# Patient Record
Sex: MALE | Race: WHITE | NOT HISPANIC OR LATINO | Employment: PART TIME | URBAN - NONMETROPOLITAN AREA
[De-identification: names, ages, dates, MRNs, and addresses within clinical notes are randomized per-mention and may not be internally consistent; named-entity substitution may affect disease eponyms.]

---

## 2018-01-27 ENCOUNTER — HOSPITAL ENCOUNTER (EMERGENCY)
Facility: HOSPITAL | Age: 23
Discharge: HOME | End: 2018-01-28
Attending: FAMILY MEDICINE | Admitting: FAMILY MEDICINE
Payer: COMMERCIAL

## 2018-01-27 DIAGNOSIS — M62.830 LUMBAR PARASPINAL MUSCLE SPASM: ICD-10-CM

## 2018-01-27 PROCEDURE — 99285 EMERGENCY DEPT VISIT HI MDM: CPT | Mod: 25

## 2018-01-27 PROCEDURE — 96375 TX/PRO/DX INJ NEW DRUG ADDON: CPT

## 2018-01-27 PROCEDURE — 96372 THER/PROPH/DIAG INJ SC/IM: CPT | Mod: 59

## 2018-01-27 PROCEDURE — 96374 THER/PROPH/DIAG INJ IV PUSH: CPT

## 2018-01-27 PROCEDURE — 99283 EMERGENCY DEPT VISIT LOW MDM: CPT | Performed by: FAMILY MEDICINE

## 2018-01-27 NOTE — ED AVS SNAPSHOT
[facility]    [address] [address]    Phone:  [phone]                                       Edgar Garrett   MRN: [id number]    Department:  [facility]   Date of Visit:  1/27/2018           Patient Information     Date Of Birth          1995        Your diagnoses for this visit were:     Lumbar paraspinal muscle spasm        You were seen by Nisa Hudson MD.      Follow-up Information     Follow up with Yevgeniy Taylor MD.    Specialty:  Family Practice    Why:  As needed if symptoms not improved over weekend     Contact information:    [facility]  [address] [place] [address]  [phone]          Call Yevgeniy Taylor MD.    Specialty:  Family Practice    Contact information:    [facility]  [address] [place] [address]  [phone]          Discharge Instructions         Causes of Lumbar (Low Back) Pain  Low back pain can be caused by problems with any part of the lumbar spine. A disk can herniate (push out) and press on a nerve. Vertebrae can rub against each other or slip out of place. This can irritate facet joints and nerves. It can also lead to stenosis, a narrowing of the spinal canal or foramen.  Pressure from a disk  Constant wear and tear on a disk can cause it to weaken and push outward. Part of the disk may then press on nearby nerves. There are two common types of herniated disks:  Contained means the soft nucleus is protruding outward.   Extruded means the firm annulus has torn, letting the soft center squeeze through.     Pressure from bone  An unstable spine   With age, a disk may thin and wear out. Vertebrae above and below the disk may begin to touch. This can put pressure on nerves. It can also cause bone spurs (growths) to form where the bones rub together.    Stenosis results when bone spurs narrow the foramen or spinal canal. This also puts pressure on nerves. Slipping vertebrae can irritate  nerves and joints. They can also worsen stenosis.    In some cases, vertebrae become unstable and slip forward. This is called spondylolisthesis.     Date Last Reviewed: 10/12/2015    0265-4935 [employer]. [address] [place] [address], [place] [address]. All rights reserved. This information is not intended as a substitute for professional medical care. Always follow your healthcare professional's instructions.             Review of your medicines      START taking        Dose / Directions Last dose taken    cyclobenzaprine 10 MG tablet   Commonly known as:  FLEXERIL   Dose:  10 mg   Quantity:  20 tablet        Take 1 tablet (10 mg) by mouth 3 times daily as needed for muscle spasms   Refills:  1        oxyCODONE IR 5 MG tablet   Commonly known as:  ROXICODONE   Dose:  5 mg   Quantity:  20 tablet        Take 1 tablet (5 mg) by mouth every 6 hours as needed for pain   Refills:  0          Our records show that you are taking the medicines listed below. If these are incorrect, please call your family doctor or clinic.        Dose / Directions Last dose taken    BENADRYL ALLERGY PO   Dose:  1 tablet        Take 1 tablet by mouth daily as needed. Pt took one tab today hoping it would help his chest pain   Refills:  0        IBUPROFEN PO   Dose:  400 mg        Take 400 mg by mouth   Refills:  0        KLONOPIN PO   Dose:  1 mg        Take 1 mg by mouth as needed   Refills:  0        PRILOSEC PO   Dose:  40 mg        Take 40 mg by mouth as needed   Refills:  0                Prescriptions were sent or printed at these locations (2 Prescriptions)                   [facility] [address] [place] [address] - [address] AT [facility] [address], [place] [address]    Telephone:  [phone]   Fax:  [phone]   Hours:                  E-Prescribed (1 of 2)         cyclobenzaprine (FLEXERIL) 10 MG tablet                 Printed at Department/Unit printer (1 of 2)         oxyCODONE IR  "(ROXICODONE) 5 MG tablet                Procedures and tests performed during your visit     Peripheral IV catheter    XR Lumbar Spine 2/3 Views      Orders Needing Specimen Collection     None      Pending Results     Date and Time Order Name Status Description    2018 0034 XR Lumbar Spine 2/3 Views In process             Pending Culture Results     No orders found for last 3 day(s).            Thank you for choosing [facility]       Thank you for choosing [facility] for your care. Our goal is always to provide you with excellent care. Hearing back from our patients is one way we can continue to improve our services. Please take a few minutes to complete the written survey that you may receive in the mail after you visit with us. Thank you!        "[employer] Information     [email or web address] lets you send messages to your doctor, view your test results, renew your prescriptions, schedule appointments and more. To sign up, go to [email or web address] . Click on \"Log in\" on the left side of the screen, which will take you to the Welcome page. Then click on \"Sign up Now\" on the right side of the page.     You will be asked to enter the access code listed below, as well as some personal information. Please follow the directions to create your username and password.     Your access code is: [email or web address]  Expires: 2018  2:15 AM     Your access code will  in 90 days. If you need help or a new code, please call your [facility] clinic or [phone].        Care EveryWhere ID     This is your Care EveryWhere ID. This could be used by other organizations to access your [facility] medical records  [id number]        Equal Access to Services     [facility] : Hadoscar somers Sofrieda, waaxda luqadaha, qaybta kaalmada adedeclan, skyler diop . So [facility] [phone].    ATENCIÓN: Si habla español, tiene a johnson disposición servicios gratuitos de asistencia lingüística. Llame al [phone].    We " comply with applicable federal civil rights laws and [place] laws. We do not discriminate on the basis of race, color, national origin, age, disability, sex, sexual orientation, or gender identity.            After Visit Summary       This is your record. Keep this with you and show to your community pharmacist(s) and doctor(s) at your next visit.

## 2018-01-27 NOTE — ED AVS SNAPSHOT
[facility]    [address] [address]    Phone:  [phone]                                       Edgar Garrett   MRN: [id number]    Department[facility]  [facility]   Date of Visit:  1/27/2018           After Visit Summary Signature Page     I have received my discharge instructions, and my questions have been answered. I have discussed any challenges I see with this plan with the nurse or doctor.    ..........................................................................................................................................  Patient/Patient Representative Signature      ..........................................................................................................................................  Patient Representative Print Name and Relationship to Patient    ..................................................               ................................................  Date                                            Time    ..........................................................................................................................................  Reviewed by Signature/Title    ...................................................              ..............................................  Date                                                            Time

## 2018-01-28 ENCOUNTER — APPOINTMENT (OUTPATIENT)
Dept: CT IMAGING | Facility: HOSPITAL | Age: 23
End: 2018-01-28
Attending: FAMILY MEDICINE
Payer: COMMERCIAL

## 2018-01-28 ENCOUNTER — APPOINTMENT (OUTPATIENT)
Dept: GENERAL RADIOLOGY | Facility: HOSPITAL | Age: 23
End: 2018-01-28
Attending: FAMILY MEDICINE
Payer: COMMERCIAL

## 2018-01-28 VITALS
HEART RATE: 86 BPM | TEMPERATURE: 98.2 F | DIASTOLIC BLOOD PRESSURE: 85 MMHG | RESPIRATION RATE: 16 BRPM | SYSTOLIC BLOOD PRESSURE: 147 MMHG | OXYGEN SATURATION: 99 %

## 2018-01-28 PROCEDURE — 72128 CT CHEST SPINE W/O DYE: CPT | Mod: TC

## 2018-01-28 PROCEDURE — 25000125 ZZHC RX 250: Performed by: FAMILY MEDICINE

## 2018-01-28 PROCEDURE — 72131 CT LUMBAR SPINE W/O DYE: CPT | Mod: TC

## 2018-01-28 PROCEDURE — 72100 X-RAY EXAM L-S SPINE 2/3 VWS: CPT | Mod: TC

## 2018-01-28 PROCEDURE — 25000128 H RX IP 250 OP 636: Performed by: FAMILY MEDICINE

## 2018-01-28 PROCEDURE — 25000132 ZZH RX MED GY IP 250 OP 250 PS 637: Performed by: FAMILY MEDICINE

## 2018-01-28 RX ORDER — FENTANYL CITRATE 50 UG/ML
50 INJECTION, SOLUTION INTRAMUSCULAR; INTRAVENOUS ONCE
Status: COMPLETED | OUTPATIENT
Start: 2018-01-28 | End: 2018-01-28

## 2018-01-28 RX ORDER — OXYCODONE HYDROCHLORIDE 5 MG/1
10 TABLET ORAL ONCE
Status: COMPLETED | OUTPATIENT
Start: 2018-01-28 | End: 2018-01-28

## 2018-01-28 RX ORDER — KETOROLAC TROMETHAMINE 30 MG/ML
60 INJECTION, SOLUTION INTRAMUSCULAR; INTRAVENOUS ONCE
Status: COMPLETED | OUTPATIENT
Start: 2018-01-28 | End: 2018-01-28

## 2018-01-28 RX ORDER — CYCLOBENZAPRINE HCL 10 MG
10 TABLET ORAL 3 TIMES DAILY PRN
Qty: 20 TABLET | Refills: 1 | Status: SHIPPED | OUTPATIENT
Start: 2018-01-28 | End: 2020-01-25

## 2018-01-28 RX ORDER — OXYCODONE HYDROCHLORIDE 5 MG/1
5 TABLET ORAL EVERY 6 HOURS PRN
Qty: 20 TABLET | Refills: 0 | Status: SHIPPED | OUTPATIENT
Start: 2018-01-28 | End: 2022-08-30

## 2018-01-28 RX ORDER — CYCLOBENZAPRINE HCL 10 MG
10 TABLET ORAL ONCE
Status: COMPLETED | OUTPATIENT
Start: 2018-01-28 | End: 2018-01-28

## 2018-01-28 RX ORDER — ONDANSETRON 4 MG/1
4 TABLET, ORALLY DISINTEGRATING ORAL ONCE
Status: COMPLETED | OUTPATIENT
Start: 2018-01-28 | End: 2018-01-28

## 2018-01-28 RX ADMIN — OXYCODONE HYDROCHLORIDE 10 MG: 5 TABLET ORAL at 02:09

## 2018-01-28 RX ADMIN — KETOROLAC TROMETHAMINE 60 MG: 30 INJECTION, SOLUTION INTRAMUSCULAR at 03:05

## 2018-01-28 RX ADMIN — CYCLOBENZAPRINE HYDROCHLORIDE 10 MG: 10 TABLET, FILM COATED ORAL at 02:09

## 2018-01-28 RX ADMIN — HYDROMORPHONE HYDROCHLORIDE 1 MG: 1 INJECTION, SOLUTION INTRAMUSCULAR; INTRAVENOUS; SUBCUTANEOUS at 01:37

## 2018-01-28 RX ADMIN — FENTANYL CITRATE 50 MCG: 50 INJECTION INTRAMUSCULAR; INTRAVENOUS at 00:44

## 2018-01-28 RX ADMIN — ONDANSETRON 4 MG: 4 TABLET, ORALLY DISINTEGRATING ORAL at 03:16

## 2018-01-28 ASSESSMENT — ENCOUNTER SYMPTOMS
WEIGHT LOSS: 0
EYES NEGATIVE: 1
ENDOCRINE NEGATIVE: 1
PERIANAL NUMBNESS: 0
BACK PAIN: 1
BOWEL INCONTINENCE: 0
LEG PAIN: 1
CARDIOVASCULAR NEGATIVE: 1
ABDOMINAL PAIN: 0
WEAKNESS: 0
FEVER: 0
DYSURIA: 0
TINGLING: 0
HEADACHES: 0
PARESTHESIAS: 0
PSYCHIATRIC NEGATIVE: 1
NUMBNESS: 0
ALLERGIC/IMMUNOLOGIC NEGATIVE: 1
ABDOMINAL SWELLING: 0
RESPIRATORY NEGATIVE: 1

## 2018-01-28 NOTE — ED NOTES
Discharge instructions completed with patient with no questions or concerns. Vital signs stable, pain down to almost nothing while patient is lying still. Patient's friend giving patient ride home. Written prescription sent with patient, also aware of prescription to be picked up.

## 2018-01-28 NOTE — PROGRESS NOTES
Lumbar Spine XR - IMPRESSION: Degenerative changes of the lumbar spine.  Recommend follow up with primary care next week if symptoms not improving.  Report routed to PCP, Dr. COREY Taylor.

## 2018-01-28 NOTE — DISCHARGE INSTRUCTIONS
Causes of Lumbar (Low Back) Pain  Low back pain can be caused by problems with any part of the lumbar spine. A disk can herniate (push out) and press on a nerve. Vertebrae can rub against each other or slip out of place. This can irritate facet joints and nerves. It can also lead to stenosis, a narrowing of the spinal canal or foramen.  Pressure from a disk  Constant wear and tear on a disk can cause it to weaken and push outward. Part of the disk may then press on nearby nerves. There are two common types of herniated disks:  Contained means the soft nucleus is protruding outward.   Extruded means the firm annulus has torn, letting the soft center squeeze through.     Pressure from bone  An unstable spine   With age, a disk may thin and wear out. Vertebrae above and below the disk may begin to touch. This can put pressure on nerves. It can also cause bone spurs (growths) to form where the bones rub together.    Stenosis results when bone spurs narrow the foramen or spinal canal. This also puts pressure on nerves. Slipping vertebrae can irritate nerves and joints. They can also worsen stenosis.    In some cases, vertebrae become unstable and slip forward. This is called spondylolisthesis.     Date Last Reviewed: 10/12/2015    7938-3323 [employer]. [address] [place] [address], [place] [address]. All rights reserved. This information is not intended as a substitute for professional medical care. Always follow your healthcare professional's instructions.

## 2018-01-28 NOTE — ED NOTES
Patient presents accompanied by [facility] with complaint of back pain. Patient reports a sudden onset of back pain this evening while at home, tried taking Ibuprofen which did not improve the pain. Currently rating the pain 8/10. Reports a history of back pain, and has previously seen a chiropractor. Also reports right sciatica. Call light within reach.

## 2018-01-28 NOTE — PROGRESS NOTES
Lumbar spine CT w/o contrast - IMPRESSION: Degenerative changes L3-L4, L4-L5.   Recommend follow up with primary care next week if symptoms not improving.  Report routed to PCP, Dr. COREY Taylor.

## 2018-01-28 NOTE — ED PROVIDER NOTES
History     Chief Complaint   Patient presents with     Back Pain     Started suddenly tonight, 8/10.      Patient is a 22 year old male presenting with back pain.   Back Pain   Location:  Lumbar spine  Radiates to: lateral aspect of right leg . also left leg but les s.  Pain severity:  Severe  Pain is:  Same all the time  Onset quality:  Sudden  Duration:  1 hour  Timing:  Constant  Progression:  Unchanged  Chronicity:  New  Context: twisting    Context: not emotional stress, not falling, not jumping from heights, not lifting heavy objects, not MCA, not MVA, not occupational injury, not pedestrian accident, not physical stress, not recent illness and not recent injury    Relieved by:  Nothing  Worsened by:  Bending, movement, standing and twisting  Ineffective treatments:  NSAIDs  Associated symptoms: leg pain    Associated symptoms: no abdominal pain, no abdominal swelling, no bladder incontinence, no bowel incontinence, no chest pain, no dysuria, no fever, no headaches, no numbness, no paresthesias, no pelvic pain, no perianal numbness, no tingling, no weakness and no weight loss    Risk factors: obesity    Risk factors: no hx of cancer, no hx of osteoporosis, no lack of exercise, no menopause, not pregnant, no recent surgery, no steroid use and no vascular disease      Edgar Garrett is a 22 year old male who presents to ER by EMS after having sudden onset of severe back pain this evening while play ing cards . Patient was sitting in a chair and just turned in one direction and felt a sudden pop and immediate pain  . Patient called ambulance .     Problem List:    There are no active problems to display for this patient.       Past Medical History:    No past medical history on file.    Past Surgical History:    No past surgical history on file.    Family History:    No family history on file.    Social History:  Marital Status:  Single [1]  Social History   Substance Use Topics     Smoking status: Never  Smoker     Smokeless tobacco: Not on file     Alcohol use Not on file        Medications:      IBUPROFEN PO   Omeprazole (PRILOSEC PO)   ClonazePAM (KLONOPIN PO)   DiphenhydrAMINE HCl (BENADRYL ALLERGY PO)         Review of Systems   Constitutional: Negative for fever and weight loss.   HENT: Negative.    Eyes: Negative.    Respiratory: Negative.    Cardiovascular: Negative.  Negative for chest pain.   Gastrointestinal: Negative for abdominal pain and bowel incontinence.   Endocrine: Negative.    Genitourinary: Negative.  Negative for bladder incontinence, dysuria and pelvic pain.   Musculoskeletal: Positive for back pain.   Skin: Negative.    Allergic/Immunologic: Negative.    Neurological: Negative for tingling, weakness, numbness, headaches and paresthesias.   Psychiatric/Behavioral: Negative.    All other systems reviewed and are negative.      Physical Exam   BP: (!) 171/108  Pulse: 81  Temp: 97.5  F (36.4  C)  Resp: 16  SpO2: 98 %      Physical Exam   Constitutional: He is oriented to person, place, and time. He appears well-developed and well-nourished.   HENT:   Head: Normocephalic and atraumatic.   Neck: Normal range of motion.   Cardiovascular: Normal rate, regular rhythm and normal heart sounds.    Pulmonary/Chest: Effort normal and breath sounds normal.   Abdominal: Soft.   Protuberant abdomen without mass, tenderness    Musculoskeletal: He exhibits tenderness. He exhibits no edema or deformity.   Patient lying in hospital bed . Difficult exam as patient refusing to move. Negative straight leg raise.Tenderness at 30 degrees radiating only to the lower back . Equally strong lower extremities. Normalsymmetric  reflexes at patella and achilles.    Neurological: He is alert and oriented to person, place, and time.   Skin: Skin is warm and dry.   Nursing note and vitals reviewed.      ED Course     ED Course     Procedures          Patient arrived to ED by EMS with complaint of acute onset of low back pain .  Patient triaged to exam room. Vital signs significant for elevated blood presssure. Medical records reviewed. History and exam completed. Exam without acute neurologic findings. Patient given IV fentanyl , dilaudid, with improvement in pain 2/10 Patient then given oxycodone and flexeril with plan for discharge. Patient stating too uncomfortable for discharge. Blood pressures had improved at this time 60 mg toradol administered. CT lumbar and thoracic spine significant for degenerative disc disease at L3-L5 but no significant stenosis   . Results conveyed to patient. Patient discharged with small quantity of oxycodone and flexeril . REcommend Ibuprofen 600-800 mg 3-4 times daily with food. Ice next 48 hours. Recommend follow up with primary care next week if symptoms not improving          Labs Ordered and Resulted from Time of ED Arrival Up to the Time of Departure from the ED - No data to display    Assessments & Plan (with Medical Decision Making)     I have reviewed the nursing notes.    I have reviewed the findings, diagnosis, plan and need for follow up with the patient.       New Prescriptions    No medications on file       Final diagnoses:   Lumbar paraspinal muscle spasm       1/27/2018   [facility]     Nisa Hudson MD  01/28/18 0899

## 2018-01-28 NOTE — ED NOTES
Patient aware of CT results. Continues to have pain with movement, but now content with being discharged. Instructions previously reviewed with patient. Written prescription with patient. Patient escorted out by wheelchair by this writer.

## 2018-01-30 ENCOUNTER — OFFICE VISIT (OUTPATIENT)
Dept: CHIROPRACTIC MEDICINE | Facility: OTHER | Age: 23
End: 2018-01-30
Attending: CHIROPRACTOR
Payer: COMMERCIAL

## 2018-01-30 DIAGNOSIS — M99.02 SEGMENTAL AND SOMATIC DYSFUNCTION OF THORACIC REGION: ICD-10-CM

## 2018-01-30 DIAGNOSIS — M99.03 SEGMENTAL AND SOMATIC DYSFUNCTION OF LUMBAR REGION: Primary | ICD-10-CM

## 2018-01-30 DIAGNOSIS — M54.42 ACUTE BILATERAL LOW BACK PAIN WITH BILATERAL SCIATICA: ICD-10-CM

## 2018-01-30 DIAGNOSIS — M54.41 ACUTE BILATERAL LOW BACK PAIN WITH BILATERAL SCIATICA: ICD-10-CM

## 2018-01-30 PROCEDURE — 99202 OFFICE O/P NEW SF 15 MIN: CPT | Mod: 25 | Performed by: CHIROPRACTOR

## 2018-01-30 PROCEDURE — 98940 CHIROPRACT MANJ 1-2 REGIONS: CPT | Mod: AT | Performed by: CHIROPRACTOR

## 2018-01-30 NOTE — PROGRESS NOTES
Subjective Finding:    Chief compalint: Patient presents with:  Back Pain: intense low back pain  , Pain Scale: 9/10, Intensity: sharp, Duration: 3 days, Radiating: down right leg.    Date of injury:     Activities that the pain restricts:   Home/household/hobbies/social activities: yes.  Work duties: yes.  Sleep: yes.  Makes symptoms better: rest.  Makes symptoms worse: activity.  Have you seen anyone else for the symptoms? Yes: MD.  Work related: no.  Automobile related injury: no.    Objective and Assessment:    Posture Analysis:   High shoulder: .  Head tilt: .  High iliac crest: right.  Head carriage: neutral.  Thoracic Kyphosis: forward.  Lumbar Lordosis: forward.    Lumbar Range of Motion: extension decreased, left lateral flexion decreased and right lateral flexion decreased.  Cervical Range of Motion: .  Thoracic Range of Motion: extension decreased.  Extremity Range of Motion: .    Palpation:   Quad lumb: bilateral, referred pain: yes    Segmental dysfunction pre-treatment and treatment area: T11, L5 and PSIS Right.    Assessment post-treatment:  Cervical: .  Thoracic: ROM increased.  Lumbar: ROM increased.    Comments: .      Complicating Factors: .    Procedure(s):  [facility]:  [address] Chiropractic manipulative treatment 1-2 regions performed   Thoracic: Diversified, See above for level, Prone and Lumbar: Diversified, See above for level, Side posture    Modalities:  None performed this visit    Therapeutic procedures:  None    Plan:  Treatment plan: 2 times per week for 2 weeks.  Instructed patient: stretch as instructed at visit.  Short term goals: increase ROM.  Long term goals: increase ADL.  Prognosis: very good.

## 2018-01-30 NOTE — MR AVS SNAPSHOT
"              After Visit Summary   1/30/2018    Edgar Garrett    MRN: [id number]           Patient Information     Date Of Birth          1995        Visit Information        Provider Department      1/30/2018 10:40 AM [facility]        Today's Diagnoses     Segmental and somatic dysfunction of lumbar region    -  1    Acute bilateral low back pain with bilateral sciatica        Segmental and somatic dysfunction of thoracic region           Follow-ups after your visit        Your next 10 appointments already scheduled     Jan 31, 2018 10:10 AM CST   Return Visit with Shaheed Mcnulty DC   [facility] [place] ([facility])    [address]  [place] [address]   [phone]              Who to contact     If you have questions or need follow up information about today's clinic visit or your schedule please contact  [facility] [place] directly at [phone].  Normal or non-critical lab and imaging results will be communicated to you by [employer], letter or phone within 4 business days after the clinic has received the results. If you do not hear from us within 7 days, please contact the clinic through MyChart or phone. If you have a critical or abnormal lab result, we will notify you by phone as soon as possible.  Submit refill requests through [employer] or call your pharmacy and they will forward the refill request to us. Please allow 3 business days for your refill to be completed.          Additional Information About Your Visit        MyChart Information     [employer] lets you send messages to your doctor, view your test results, renew your prescriptions, schedule appointments and more. To sign up, go to [email or web address] . Click on \"Log in\" on the left side of the screen, which will take you to the Welcome page. Then click on \"Sign up Now\" on the right side of the page.     You will be asked to enter the access code listed below, as well as some " personal information. Please follow the directions to create your username and password.     Your access code is: [email or web address]  Expires: 2018  2:15 AM     Your access code will  in 90 days. If you need help or a new code, please call your [facility] clinic or [phone].        Care EveryWhere ID     This is your Care EveryWhere ID. This could be used by other organizations to access your [facility] medical records  [id number]         Blood Pressure from Last 3 Encounters:   18 147/85   16 159/89   02/10/15 134/85    Weight from Last 3 Encounters:   13 266 lb (120.7 kg) (>99 %)*     * Growth percentiles are based on [facility] 2-20 Years data.              We Performed the Following     CHIROPRAC MANIP,SPINAL,1-2 REGIONS        Primary Care Provider Office Phone # Fax #    Yevgeniy Taylor [phone]       [facility] [address] [facility] [address]        Equal Access to Services     [facility]: Hadii aad ku hadasho Soomaali, waaxda luqadaha, qaybta kaalmada adeegyada, waxay lashonda hayaan mainor diop . So [facility] [phone].    ATENCIÓN: Si habla español, tiene a johnson disposición servicios gratuitos de asistencia lingüística. Llame al [phone].    We comply with applicable federal civil rights laws and [place] laws. We do not discriminate on the basis of race, color, national origin, age, disability, sex, sexual orientation, or gender identity.            Thank you!     Thank you for choosing  [facility]  for your care. Our goal is always to provide you with excellent care. Hearing back from our patients is one way we can continue to improve our services. Please take a few minutes to complete the written survey that you may receive in the mail after your visit with us. Thank you!             Your Updated Medication List - Protect others around you: Learn how to safely use, store and throw away your medicines at www.disposemymeds.org.          This  list is accurate as of 1/30/18 12:52 PM.  Always use your most recent med list.                   Brand Name Dispense Instructions for use Diagnosis    BENADRYL ALLERGY PO      Take 1 tablet by mouth daily as needed. Pt took one tab today hoping it would help his chest pain        cyclobenzaprine 10 MG tablet    FLEXERIL    20 tablet    Take 1 tablet (10 mg) by mouth 3 times daily as needed for muscle spasms        IBUPROFEN PO      Take 400 mg by mouth        KLONOPIN PO      Take 1 mg by mouth as needed        oxyCODONE IR 5 MG tablet    ROXICODONE    20 tablet    Take 1 tablet (5 mg) by mouth every 6 hours as needed for pain        PRILOSEC PO      Take 40 mg by mouth as needed

## 2018-01-31 ENCOUNTER — OFFICE VISIT (OUTPATIENT)
Dept: CHIROPRACTIC MEDICINE | Facility: OTHER | Age: 23
End: 2018-01-31
Attending: CHIROPRACTOR
Payer: COMMERCIAL

## 2018-01-31 DIAGNOSIS — M54.50 ACUTE BILATERAL LOW BACK PAIN WITHOUT SCIATICA: ICD-10-CM

## 2018-01-31 DIAGNOSIS — M99.02 SEGMENTAL AND SOMATIC DYSFUNCTION OF THORACIC REGION: ICD-10-CM

## 2018-01-31 DIAGNOSIS — M99.03 SEGMENTAL AND SOMATIC DYSFUNCTION OF LUMBAR REGION: Primary | ICD-10-CM

## 2018-01-31 PROCEDURE — 98940 CHIROPRACT MANJ 1-2 REGIONS: CPT | Mod: AT | Performed by: CHIROPRACTOR

## 2018-01-31 NOTE — MR AVS SNAPSHOT
"              After Visit Summary   1/31/2018    Edgar Garrett    MRN: [id number]           Patient Information     Date Of Birth          1995        Visit Information        Provider Department      1/31/2018 10:10 AM [facility]        Today's Diagnoses     Segmental and somatic dysfunction of lumbar region    -  1    Acute bilateral low back pain without sciatica        Segmental and somatic dysfunction of thoracic region           Follow-ups after your visit        Your next 10 appointments already scheduled     Feb 02, 2018  9:00 AM CST   Return Visit with Shaheed Mcnulty DC   [facility] [place] ([facility])    [address]  [place] [address]   [phone]              Who to contact     If you have questions or need follow up information about today's clinic visit or your schedule please contact  [facility] [place] directly at [phone].  Normal or non-critical lab and imaging results will be communicated to you by Movilehart, letter or phone within 4 business days after the clinic has received the results. If you do not hear from us within 7 days, please contact the clinic through MyChart or phone. If you have a critical or abnormal lab result, we will notify you by phone as soon as possible.  Submit refill requests through E-Box - [email or web address] or call your pharmacy and they will forward the refill request to us. Please allow 3 business days for your refill to be completed.          Additional Information About Your Visit        MyChart Information     E-Box - [email or web address] lets you send messages to your doctor, view your test results, renew your prescriptions, schedule appointments and more. To sign up, go to [email or web address] - [email or web address] . Click on \"Log in\" on the left side of the screen, which will take you to the Welcome page. Then click on \"Sign up Now\" on the right side of the page.     You will be asked to enter the access code listed below, as well as some personal " information. Please follow the directions to create your username and password.     Your access code is: [email or web address]  Expires: 2018  2:15 AM     Your access code will  in 90 days. If you need help or a new code, please call your [facility] clinic or [phone].        Care EveryWhere ID     This is your Care EveryWhere ID. This could be used by other organizations to access your [facility] medical records  [id number]         Blood Pressure from Last 3 Encounters:   18 147/85   16 159/89   02/10/15 134/85    Weight from Last 3 Encounters:   13 266 lb (120.7 kg) (>99 %)*     * Growth percentiles are based on [facility] 2-20 Years data.              We Performed the Following     CHIROPRAC MANIP,SPINAL,1-2 REGIONS        Primary Care Provider Office Phone # Fax #    Yevgeniy Taylor [phone]       [facility] [address] [facility] [address]        Equal Access to Services     [facility]: Hadii aad ku hadasho Soomaali, waaxda luqadaha, qaybta kaalmada adeegyada, waxay genein haypkn mainor diop . So [facility] [place] [facility] [phone].    ATENCIÓN: Si habla español, tiene a johnson disposición servicios gratuitos de asistencia lingüística. Llame al [phone].    We comply with applicable federal civil rights laws and [place] laws. We do not discriminate on the basis of race, color, national origin, age, disability, sex, sexual orientation, or gender identity.            Thank you!     Thank you for choosing  [facility]  for your care. Our goal is always to provide you with excellent care. Hearing back from our patients is one way we can continue to improve our services. Please take a few minutes to complete the written survey that you may receive in the mail after your visit with us. Thank you!             Your Updated Medication List - Protect others around you: Learn how to safely use, store and throw away your medicines at www.disposemymeds.org.          This list is  accurate as of 1/31/18 10:38 AM.  Always use your most recent med list.                   Brand Name Dispense Instructions for use Diagnosis    BENADRYL ALLERGY PO      Take 1 tablet by mouth daily as needed. Pt took one tab today hoping it would help his chest pain        cyclobenzaprine 10 MG tablet    FLEXERIL    20 tablet    Take 1 tablet (10 mg) by mouth 3 times daily as needed for muscle spasms        IBUPROFEN PO      Take 400 mg by mouth        KLONOPIN PO      Take 1 mg by mouth as needed        oxyCODONE IR 5 MG tablet    ROXICODONE    20 tablet    Take 1 tablet (5 mg) by mouth every 6 hours as needed for pain        PRILOSEC PO      Take 40 mg by mouth as needed

## 2018-01-31 NOTE — PROGRESS NOTES
Subjective Finding:    Chief compalint: Patient presents with:  Back Pain: doing better.  still out of work due to the pain.  ADL improved slightly  , Pain Scale: 5/10, Intensity: sharp, Duration: 3 days, Radiating: down right leg.    Date of injury:     Activities that the pain restricts:   Home/household/hobbies/social activities: yes.  Work duties: yes.  Sleep: yes.  Makes symptoms better: rest.  Makes symptoms worse: activity.  Have you seen anyone else for the symptoms? Yes: MD.  Work related: no.  Automobile related injury: no.    Objective and Assessment:    Posture Analysis:   High shoulder: .  Head tilt: .  High iliac crest: right.  Head carriage: neutral.  Thoracic Kyphosis: forward.  Lumbar Lordosis: forward.    Lumbar Range of Motion: extension decreased, left lateral flexion decreased and right lateral flexion decreased.  Cervical Range of Motion: .  Thoracic Range of Motion: extension decreased.  Extremity Range of Motion: .    Palpation:   Quad lumb: bilateral, referred pain: yes    Segmental dysfunction pre-treatment and treatment area: T11, L5 and PSIS Right.    Assessment post-treatment:  Cervical: .  Thoracic: ROM increased.  Lumbar: ROM increased.    Comments: .      Complicating Factors: .    Procedure(s):  CMT:  10720 Chiropractic manipulative treatment 1-2 regions performed   Thoracic: Diversified, See above for level, Prone and Lumbar: Diversified, See above for level, Side posture    Modalities:  None performed this visit    Therapeutic procedures:  None    Plan:  Treatment plan: 2 times per week for 2 weeks.  Instructed patient: stretch as instructed at visit.  Short term goals: increase ROM.  Long term goals: increase ADL.  Prognosis: very good.

## 2018-02-02 ENCOUNTER — OFFICE VISIT (OUTPATIENT)
Dept: CHIROPRACTIC MEDICINE | Facility: OTHER | Age: 23
End: 2018-02-02
Attending: CHIROPRACTOR
Payer: COMMERCIAL

## 2018-02-02 DIAGNOSIS — M99.03 SEGMENTAL AND SOMATIC DYSFUNCTION OF LUMBAR REGION: Primary | ICD-10-CM

## 2018-02-02 DIAGNOSIS — M54.50 ACUTE BILATERAL LOW BACK PAIN WITHOUT SCIATICA: ICD-10-CM

## 2018-02-02 DIAGNOSIS — M99.02 SEGMENTAL AND SOMATIC DYSFUNCTION OF THORACIC REGION: ICD-10-CM

## 2018-02-02 PROCEDURE — 98940 CHIROPRACT MANJ 1-2 REGIONS: CPT | Mod: AT | Performed by: CHIROPRACTOR

## 2018-02-02 NOTE — MR AVS SNAPSHOT
"              After Visit Summary   2/2/2018    Edgar Garrett    MRN: [id number]           Patient Information     Date Of Birth          1995        Visit Information        Provider Department      2/2/2018 9:00 AM [facility]        Today's Diagnoses     Segmental and somatic dysfunction of lumbar region    -  1    Acute bilateral low back pain without sciatica        Segmental and somatic dysfunction of thoracic region           Follow-ups after your visit        Your next 10 appointments already scheduled     Feb 09, 2018  9:30 AM CST   Return Visit with Shaheed Mcnulty DC   [facility] [place] ([facility])    [address]  [place] [address]   [phone]              Who to contact     If you have questions or need follow up information about today's clinic visit or your schedule please contact  [facility] [place] directly at [phone].  Normal or non-critical lab and imaging results will be communicated to you by Symbian Foundationhart, letter or phone within 4 business days after the clinic has received the results. If you do not hear from us within 7 days, please contact the clinic through MyChart or phone. If you have a critical or abnormal lab result, we will notify you by phone as soon as possible.  Submit refill requests through Minefold or call your pharmacy and they will forward the refill request to us. Please allow 3 business days for your refill to be completed.          Additional Information About Your Visit        MyChart Information     Minefold lets you send messages to your doctor, view your test results, renew your prescriptions, schedule appointments and more. To sign up, go to [email or web address] . Click on \"Log in\" on the left side of the screen, which will take you to the Welcome page. Then click on \"Sign up Now\" on the right side of the page.     You will be asked to enter the access code listed below, as well as some personal " information. Please follow the directions to create your username and password.     Your access code is: [email or web address]  Expires: 2018  2:15 AM     Your access code will  in 90 days. If you need help or a new code, please call your [facility] clinic or [phone].        Care EveryWhere ID     This is your Care EveryWhere ID. This could be used by other organizations to access your [facility] medical records  [id number]         Blood Pressure from Last 3 Encounters:   18 147/85   16 159/89   02/10/15 134/85    Weight from Last 3 Encounters:   13 266 lb (120.7 kg) (>99 %)*     * Growth percentiles are based on [facility] 2-20 Years data.              We Performed the Following     CHIROPRAC MANIP,SPINAL,1-2 REGIONS        Primary Care Provider Office Phone # Fax #    Yevgeniy Taylor [phone]       [facility] [address] [facility] [address]        Equal Access to Services     [facility]: Hadii aad ku hadasho Soomaali, waaxda luqadaha, qaybta kaalmada adeegyada, waxay genein haypkn mainor diop . So [facility] [phone].    ATENCIÓN: Si habla español, tiene a johnson disposición servicios gratuitos de asistencia lingüística. Llame al [phone].    We comply with applicable federal civil rights laws and [place] laws. We do not discriminate on the basis of race, color, national origin, age, disability, sex, sexual orientation, or gender identity.            Thank you!     Thank you for choosing  [facility]  for your care. Our goal is always to provide you with excellent care. Hearing back from our patients is one way we can continue to improve our services. Please take a few minutes to complete the written survey that you may receive in the mail after your visit with us. Thank you!             Your Updated Medication List - Protect others around you: Learn how to safely use, store and throw away your medicines at www.disposemymeds.org.          This list is  accurate as of 2/2/18 11:59 PM.  Always use your most recent med list.                   Brand Name Dispense Instructions for use Diagnosis    BENADRYL ALLERGY PO      Take 1 tablet by mouth daily as needed. Pt took one tab today hoping it would help his chest pain        cyclobenzaprine 10 MG tablet    FLEXERIL    20 tablet    Take 1 tablet (10 mg) by mouth 3 times daily as needed for muscle spasms        IBUPROFEN PO      Take 400 mg by mouth        KLONOPIN PO      Take 1 mg by mouth as needed        oxyCODONE IR 5 MG tablet    ROXICODONE    20 tablet    Take 1 tablet (5 mg) by mouth every 6 hours as needed for pain        PRILOSEC PO      Take 40 mg by mouth as needed

## 2018-02-05 ENCOUNTER — OFFICE VISIT (OUTPATIENT)
Dept: CHIROPRACTIC MEDICINE | Facility: OTHER | Age: 23
End: 2018-02-05
Attending: CHIROPRACTOR
Payer: COMMERCIAL

## 2018-02-05 DIAGNOSIS — M54.50 ACUTE BILATERAL LOW BACK PAIN WITHOUT SCIATICA: ICD-10-CM

## 2018-02-05 DIAGNOSIS — M99.02 SEGMENTAL AND SOMATIC DYSFUNCTION OF THORACIC REGION: ICD-10-CM

## 2018-02-05 DIAGNOSIS — M99.03 SEGMENTAL AND SOMATIC DYSFUNCTION OF LUMBAR REGION: Primary | ICD-10-CM

## 2018-02-05 PROCEDURE — 98940 CHIROPRACT MANJ 1-2 REGIONS: CPT | Mod: AT | Performed by: CHIROPRACTOR

## 2018-02-05 NOTE — PROGRESS NOTES
Subjective Finding:    Chief compalint: Patient presents with:  Back Pain: getting better./  going to work  , Pain Scale: 3/10, Intensity: sharp, Duration: 5 days, Radiating: down right leg.    Date of injury:     Activities that the pain restricts:   Home/household/hobbies/social activities: yes.  Work duties: yes.  Sleep: yes.  Makes symptoms better: rest.  Makes symptoms worse: activity.  Have you seen anyone else for the symptoms? Yes: MD.  Work related: no.  Automobile related injury: no.    Objective and Assessment:    Posture Analysis:   High shoulder: .  Head tilt: .  High iliac crest: right.  Head carriage: neutral.  Thoracic Kyphosis: forward.  Lumbar Lordosis: forward.    Lumbar Range of Motion: extension decreased, left lateral flexion decreased and right lateral flexion decreased.  Cervical Range of Motion: .  Thoracic Range of Motion: extension decreased.  Extremity Range of Motion: .    Palpation:   Quad lumb: bilateral, referred pain: yes    Segmental dysfunction pre-treatment and treatment area: T11, L5 and PSIS Right.    Assessment post-treatment:  Cervical: .  Thoracic: ROM increased.  Lumbar: ROM increased.    Comments: .      Complicating Factors: .    Procedure(s):  CMT:  23579 Chiropractic manipulative treatment 1-2 regions performed   Thoracic: Diversified, See above for level, Prone and Lumbar: Diversified, See above for level, Side posture    Modalities:  None performed this visit    Therapeutic procedures:  None    Plan:  Treatment plan: 2 times per week for 2 weeks.  Instructed patient: stretch as instructed at visit.  Short term goals: increase ROM.  Long term goals: increase ADL.  Prognosis: very good.

## 2018-02-05 NOTE — PROGRESS NOTES
Subjective Finding:    Chief compalint: Patient presents with:  Back Pain: getting better  , Pain Scale: 3/10, Intensity: sharp, Duration: 5 days, Radiating: down right leg.    Date of injury:     Activities that the pain restricts:   Home/household/hobbies/social activities: yes.  Work duties: yes.  Sleep: yes.  Makes symptoms better: rest.  Makes symptoms worse: activity.  Have you seen anyone else for the symptoms? Yes: MD.  Work related: no.  Automobile related injury: no.    Objective and Assessment:    Posture Analysis:   High shoulder: .  Head tilt: .  High iliac crest: right.  Head carriage: neutral.  Thoracic Kyphosis: forward.  Lumbar Lordosis: forward.    Lumbar Range of Motion: extension decreased, left lateral flexion decreased and right lateral flexion decreased.  Cervical Range of Motion: .  Thoracic Range of Motion: extension decreased.  Extremity Range of Motion: .    Palpation:   Quad lumb: bilateral, referred pain: yes    Segmental dysfunction pre-treatment and treatment area: T11, L5 and PSIS Right.    Assessment post-treatment:  Cervical: .  Thoracic: ROM increased.  Lumbar: ROM increased.    Comments: .      Complicating Factors: .    Procedure(s):  [facility]:  52238 Chiropractic manipulative treatment 1-2 regions performed   Thoracic: Diversified, See above for level, Prone and Lumbar: Diversified, See above for level, Side posture    Modalities:  None performed this visit    Therapeutic procedures:  None    Plan:  Treatment plan: 2 times per week for 2 weeks.  Instructed patient: stretch as instructed at visit.  Short term goals: increase ROM.  Long term goals: increase ADL.  Prognosis: very good.

## 2018-02-05 NOTE — MR AVS SNAPSHOT
"              After Visit Summary   2/5/2018    Edgar Garrett    MRN: [id number]           Patient Information     Date Of Birth          1995        Visit Information        Provider Department      2/5/2018 9:10 AM [facility]        Today's Diagnoses     Segmental and somatic dysfunction of lumbar region    -  1    Acute bilateral low back pain without sciatica        Segmental and somatic dysfunction of thoracic region           Follow-ups after your visit        Your next 10 appointments already scheduled     Feb 09, 2018  9:30 AM CST   Return Visit with Shaheed Mcnulty DC   [facility] [place] ([facility])    [address]  [place] [address]   [phone]              Who to contact     If you have questions or need follow up information about today's clinic visit or your schedule please contact  [facility] [place] directly at [phone].  Normal or non-critical lab and imaging results will be communicated to you by Interactive Fitnesshart, letter or phone within 4 business days after the clinic has received the results. If you do not hear from us within 7 days, please contact the clinic through MyChart or phone. If you have a critical or abnormal lab result, we will notify you by phone as soon as possible.  Submit refill requests through [employer] or call your pharmacy and they will forward the refill request to us. Please allow 3 business days for your refill to be completed.          Additional Information About Your Visit        MyChart Information     [employer] lets you send messages to your doctor, view your test results, renew your prescriptions, schedule appointments and more. To sign up, go to [email or web address] . Click on \"Log in\" on the left side of the screen, which will take you to the Welcome page. Then click on \"Sign up Now\" on the right side of the page.     You will be asked to enter the access code listed below, as well as some personal " information. Please follow the directions to create your username and password.     Your access code is: [email or web address]  Expires: 2018  2:15 AM     Your access code will  in 90 days. If you need help or a new code, please call your [facility] clinic or [phone].        Care EveryWhere ID     This is your Care EveryWhere ID. This could be used by other organizations to access your [facility] medical records  [id number]         Blood Pressure from Last 3 Encounters:   18 147/85   16 159/89   02/10/15 134/85    Weight from Last 3 Encounters:   13 266 lb (120.7 kg) (>99 %)*     * Growth percentiles are based on [facility] 2-20 Years data.              We Performed the Following     CHIROPRAC MANIP,SPINAL,1-2 REGIONS        Primary Care Provider Office Phone # Fax #    Yevgeniy Taylor [phone]       [facility] [address] [facility] [address]        Equal Access to Services     [facility]: Hadii aad ku hadasho Soomaali, waaxda luqadaha, qaybta kaalmada adeegyada, waxay genein haypkn mainor diop . So [facility] [phone].    ATENCIÓN: Si habla español, tiene a johnson disposición servicios gratuitos de asistencia lingüística. Llame al [phone].    We comply with applicable federal civil rights laws and [place] laws. We do not discriminate on the basis of race, color, national origin, age, disability, sex, sexual orientation, or gender identity.            Thank you!     Thank you for choosing  [facility]  for your care. Our goal is always to provide you with excellent care. Hearing back from our patients is one way we can continue to improve our services. Please take a few minutes to complete the written survey that you may receive in the mail after your visit with us. Thank you!             Your Updated Medication List - Protect others around you: Learn how to safely use, store and throw away your medicines at www.disposemymeds.org.          This list is  accurate as of 2/5/18  9:59 AM.  Always use your most recent med list.                   Brand Name Dispense Instructions for use Diagnosis    BENADRYL ALLERGY PO      Take 1 tablet by mouth daily as needed. Pt took one tab today hoping it would help his chest pain        cyclobenzaprine 10 MG tablet    FLEXERIL    20 tablet    Take 1 tablet (10 mg) by mouth 3 times daily as needed for muscle spasms        IBUPROFEN PO      Take 400 mg by mouth        KLONOPIN PO      Take 1 mg by mouth as needed        oxyCODONE IR 5 MG tablet    ROXICODONE    20 tablet    Take 1 tablet (5 mg) by mouth every 6 hours as needed for pain        PRILOSEC PO      Take 40 mg by mouth as needed

## 2018-02-09 ENCOUNTER — OFFICE VISIT (OUTPATIENT)
Dept: CHIROPRACTIC MEDICINE | Facility: OTHER | Age: 23
End: 2018-02-09
Attending: CHIROPRACTOR
Payer: COMMERCIAL

## 2018-02-09 DIAGNOSIS — M99.03 SEGMENTAL AND SOMATIC DYSFUNCTION OF LUMBAR REGION: Primary | ICD-10-CM

## 2018-02-09 DIAGNOSIS — M54.50 ACUTE BILATERAL LOW BACK PAIN WITHOUT SCIATICA: ICD-10-CM

## 2018-02-09 DIAGNOSIS — M99.02 SEGMENTAL AND SOMATIC DYSFUNCTION OF THORACIC REGION: ICD-10-CM

## 2018-02-09 PROCEDURE — 98940 CHIROPRACT MANJ 1-2 REGIONS: CPT | Mod: AT | Performed by: CHIROPRACTOR

## 2018-02-09 NOTE — MR AVS SNAPSHOT
"              After Visit Summary   2/9/2018    Edgar Garrett    MRN: [id number]           Patient Information     Date Of Birth          1995        Visit Information        Provider Department      2/9/2018 9:30 AM Shaheed Mcnulty [facility]        Today's Diagnoses     Segmental and somatic dysfunction of lumbar region    -  1    Acute bilateral low back pain without sciatica        Segmental and somatic dysfunction of thoracic region           Follow-ups after your visit        Your next 10 appointments already scheduled     Feb 16, 2018  9:30 AM CST   Return Visit with Shaheed Mcnulty DC   [facility] [place] ([facility])    [address]  [place] [address]   [phone]              Who to contact     If you have questions or need follow up information about today's clinic visit or your schedule please contact  [facility] [place] directly at [phone].  Normal or non-critical lab and imaging results will be communicated to you by Larkyhart, letter or phone within 4 business days after the clinic has received the results. If you do not hear from us within 7 days, please contact the clinic through MyChart or phone. If you have a critical or abnormal lab result, we will notify you by phone as soon as possible.  Submit refill requests through Ipselex or call your pharmacy and they will forward the refill request to us. Please allow 3 business days for your refill to be completed.          Additional Information About Your Visit        MyChart Information     Ipselex lets you send messages to your doctor, view your test results, renew your prescriptions, schedule appointments and more. To sign up, go to [email or web address] . Click on \"Log in\" on the left side of the screen, which will take you to the Welcome page. Then click on \"Sign up Now\" on the right side of the page.     You will be asked to enter the access code listed below, as well as some personal " information. Please follow the directions to create your username and password.     Your access code is: [email or web address]  Expires: 2018  2:15 AM     Your access code will  in 90 days. If you need help or a new code, please call your [facility] clinic or [phone].        Care EveryWhere ID     This is your Care EveryWhere ID. This could be used by other organizations to access your [facility] medical records  [id number]         Blood Pressure from Last 3 Encounters:   18 147/85   16 159/89   02/10/15 134/85    Weight from Last 3 Encounters:   13 266 lb (120.7 kg) (>99 %)*     * Growth percentiles are based on [facility] 2-20 Years data.              We Performed the Following     CHIROPRAC MANIP,SPINAL,1-2 REGIONS        Primary Care Provider Office Phone # Fax #    Yevgeniy Taylor [phone]       [facility] [address] [facility] [address]        Equal Access to Services     [facility]: Hadii aad ku hadasho Soomaali, waaxda luqadaha, qaybta kaalmada adeegyada, waxay genein haypkn mainor diop . So [facility] [phone].    ATENCIÓN: Si habla español, tiene a johnson disposición servicios gratuitos de asistencia lingüística. Llame al [phone].    We comply with applicable federal civil rights laws and [place] laws. We do not discriminate on the basis of race, color, national origin, age, disability, sex, sexual orientation, or gender identity.            Thank you!     Thank you for choosing  [facility]  for your care. Our goal is always to provide you with excellent care. Hearing back from our patients is one way we can continue to improve our services. Please take a few minutes to complete the written survey that you may receive in the mail after your visit with us. Thank you!             Your Updated Medication List - Protect others around you: Learn how to safely use, store and throw away your medicines at www.disposemymeds.org.          This list is  accurate as of 2/9/18 11:59 PM.  Always use your most recent med list.                   Brand Name Dispense Instructions for use Diagnosis    BENADRYL ALLERGY PO      Take 1 tablet by mouth daily as needed. Pt took one tab today hoping it would help his chest pain        cyclobenzaprine 10 MG tablet    FLEXERIL    20 tablet    Take 1 tablet (10 mg) by mouth 3 times daily as needed for muscle spasms        IBUPROFEN PO      Take 400 mg by mouth        KLONOPIN PO      Take 1 mg by mouth as needed        oxyCODONE IR 5 MG tablet    ROXICODONE    20 tablet    Take 1 tablet (5 mg) by mouth every 6 hours as needed for pain        PRILOSEC PO      Take 40 mg by mouth as needed

## 2018-02-12 NOTE — PROGRESS NOTES
Subjective Finding:    Chief compalint: Patient presents with:  Back Pain: good relief, back to work with no restrictions  , Pain Scale: 3/10, Intensity: sharp, Duration: 5 days, Radiating: down right leg.    Date of injury:     Activities that the pain restricts:   Home/household/hobbies/social activities: yes.  Work duties: yes.  Sleep: yes.  Makes symptoms better: rest.  Makes symptoms worse: activity.  Have you seen anyone else for the symptoms? Yes: MD.  Work related: no.  Automobile related injury: no.    Objective and Assessment:    Posture Analysis:   High shoulder: .  Head tilt: .  High iliac crest: right.  Head carriage: neutral.  Thoracic Kyphosis: forward.  Lumbar Lordosis: forward.    Lumbar Range of Motion: extension decreased, left lateral flexion decreased and right lateral flexion decreased.  Cervical Range of Motion: .  Thoracic Range of Motion: extension decreased.  Extremity Range of Motion: .    Palpation:   Quad lumb: bilateral, referred pain: yes    Segmental dysfunction pre-treatment and treatment area: T11, L5 and PSIS Right.    Assessment post-treatment:  Cervical: .  Thoracic: ROM increased.  Lumbar: ROM increased.    Comments: .      Complicating Factors: .    Procedure(s):  CMT:  73422 Chiropractic manipulative treatment 1-2 regions performed   Thoracic: Diversified, See above for level, Prone and Lumbar: Diversified, See above for level, Side posture    Modalities:  None performed this visit    Therapeutic procedures:  None    Plan:  Treatment plan: 2 times per week for 2 weeks.  Instructed patient: stretch as instructed at visit.  Short term goals: increase ROM.  Long term goals: increase ADL.  Prognosis: very good.

## 2018-03-29 ENCOUNTER — OFFICE VISIT (OUTPATIENT)
Dept: CHIROPRACTIC MEDICINE | Facility: OTHER | Age: 23
End: 2018-03-29
Attending: CHIROPRACTOR
Payer: COMMERCIAL

## 2018-03-29 DIAGNOSIS — M99.03 SEGMENTAL AND SOMATIC DYSFUNCTION OF LUMBAR REGION: Primary | ICD-10-CM

## 2018-03-29 DIAGNOSIS — M54.41 ACUTE RIGHT-SIDED LOW BACK PAIN WITH RIGHT-SIDED SCIATICA: ICD-10-CM

## 2018-03-29 DIAGNOSIS — M99.02 SEGMENTAL AND SOMATIC DYSFUNCTION OF THORACIC REGION: ICD-10-CM

## 2018-03-29 PROCEDURE — 98940 CHIROPRACT MANJ 1-2 REGIONS: CPT | Mod: AT | Performed by: CHIROPRACTOR

## 2018-03-29 NOTE — MR AVS SNAPSHOT
"              After Visit Summary   3/29/2018    Edgar Garrett    MRN: [id number]           Patient Information     Date Of Birth          1995        Visit Information        Provider Department      3/29/2018 1:20 PM Shaheed Mc[facility]ulty [facility]        Today's Diagnoses     Segmental and somatic dysfunction of lumbar region    -  1    Acute right-sided low back pain with right-sided sciatica        Segmental and somatic dysfunction of thoracic region           Follow-ups after your visit        Your next 10 appointments already scheduled     Apr 02, 2018  1:30 PM CDT   Return Visit with Shaheed Mcnulty DC   [facility] [place] [facility]    [address]  [facility] [address]   [phone]              Who to contact     If you have questions or need follow up information about today's clinic visit or your schedule please contact  [facility] [place] directly at [phone].  Normal or non-critical lab and imaging results will be communicated to you by MyChart, letter or phone within 4 business days after the clinic has received the results. If you do not hear from us within 7 days, please contact the clinic through Bl[employer] Instrumentshart or phone. If you have a critical or abnormal lab result, we will notify you by phone as soon as possible.  Submit refill requests through Runa or call your pharmacy and they will forward the refill request to us. Please allow 3 business days for your refill to be completed.          Additional Information About Your Visit        MyChart Information     Runa lets you send messages to your doctor, view your test results, renew your prescriptions, schedule appointments and more. To sign up, go to [email or web address] . Click on \"Log in\" on the left side of the screen, which will take you to the Welcome page. Then click on \"Sign up Now\" on the right side of the page.     You will be asked to enter the access code listed below, as well as some " personal information. Please follow the directions to create your username and password.     Your access code is: [email or web address]  Expires: 2018  3:15 AM     Your access code will  in 90 days. If you need help or a new code, please call your [place] clinic or [phone].        Care EveryWhere ID     This is your Care EveryWhere ID. This could be used by other organizations to access your [place] medical records  [id number]         Blood Pressure from Last 3 Encounters:   18 147/85   16 159/89   02/10/15 134/85    Weight from Last 3 Encounters:   13 266 lb (120.7 kg) (>99 %)*     * Growth percentiles are based on [facility] 2-20 Years data.              We Performed the Following     CHIROPRAC MANIP,SPINAL,1-2 REGIONS        Primary Care Provider Office Phone # Fax #    Yevgeniy Taylor [phone]       [facility] [address] [facility] [address]        Equal Access to Services     [facility]: Hadii aad ku hadasho Soomaali, waaxda luqadaha, qaybta kaalmada adeegyada, waxay lashonda hayaan mainor diop . So [facility] [phone].    ATENCIÓN: Si habla español, tiene a johnson disposición servicios gratuitos de asistencia lingüística. Llame al [phone].    We comply with applicable federal civil rights laws and [place] laws. We do not discriminate on the basis of race, color, national origin, age, disability, sex, sexual orientation, or gender identity.            Thank you!     Thank you for choosing  [facility]  for your care. Our goal is always to provide you with excellent care. Hearing back from our patients is one way we can continue to improve our services. Please take a few minutes to complete the written survey that you may receive in the mail after your visit with us. Thank you!             Your Updated Medication List - Protect others around you: Learn how to safely use, store and throw away your medicines at www.disposemymeds.org.          This  list is accurate as of 3/29/18  2:22 PM.  Always use your most recent med list.                   Brand Name Dispense Instructions for use Diagnosis    BENADRYL ALLERGY PO      Take 1 tablet by mouth daily as needed. Pt took one tab today hoping it would help his chest pain        cyclobenzaprine 10 MG tablet    FLEXERIL    20 tablet    Take 1 tablet (10 mg) by mouth 3 times daily as needed for muscle spasms        IBUPROFEN PO      Take 400 mg by mouth        KLONOPIN PO      Take 1 mg by mouth as needed        oxyCODONE IR 5 MG tablet    ROXICODONE    20 tablet    Take 1 tablet (5 mg) by mouth every 6 hours as needed for pain        PRILOSEC PO      Take 40 mg by mouth as needed

## 2018-03-29 NOTE — PROGRESS NOTES
Subjective Finding:    Chief compalint: Patient presents with:  Back Pain: right leg  , Pain Scale: 6/10, Intensity: sharp, Duration: 2 days, Radiating: down right leg.    Date of injury:     Activities that the pain restricts:   Home/household/hobbies/social activities: yes.  Work duties: yes.  Sleep: yes.  Makes symptoms better: rest.  Makes symptoms worse: activity.  Have you seen anyone else for the symptoms? Yes: MD.  Work related: no.  Automobile related injury: no.    Objective and Assessment:    Posture Analysis:   High shoulder: .  Head tilt: .  High iliac crest: right.  Head carriage: neutral.  Thoracic Kyphosis: forward.  Lumbar Lordosis: forward.    Lumbar Range of Motion: extension decreased, left lateral flexion decreased and right lateral flexion decreased.  Cervical Range of Motion: .  Thoracic Range of Motion: extension decreased.  Extremity Range of Motion: .    Palpation:   Quad lumb: bilateral, referred pain: yes    Segmental dysfunction pre-treatment and treatment area: T11, L5 and PSIS Right.    Assessment post-treatment:  Cervical: .  Thoracic: ROM increased.  Lumbar: ROM increased.    Comments: .      Complicating Factors: .    Procedure(s):  [facility]:  00381 Chiropractic manipulative treatment 1-2 regions performed   Thoracic: Diversified, See above for level, Prone and Lumbar: Diversified, See above for level, Side posture    Modalities:  None performed this visit    Therapeutic procedures:  None    Plan:  Treatment plan: 2 times per week for 2 weeks.  Instructed patient: stretch as instructed at visit.  Short term goals: increase ROM.  Long term goals: increase ADL.  Prognosis: very good.

## 2018-04-02 ENCOUNTER — OFFICE VISIT (OUTPATIENT)
Dept: CHIROPRACTIC MEDICINE | Facility: OTHER | Age: 23
End: 2018-04-02
Attending: CHIROPRACTOR
Payer: COMMERCIAL

## 2018-04-02 DIAGNOSIS — M99.03 SEGMENTAL AND SOMATIC DYSFUNCTION OF LUMBAR REGION: Primary | ICD-10-CM

## 2018-04-02 DIAGNOSIS — M99.02 SEGMENTAL AND SOMATIC DYSFUNCTION OF THORACIC REGION: ICD-10-CM

## 2018-04-02 DIAGNOSIS — M54.50 ACUTE BILATERAL LOW BACK PAIN WITHOUT SCIATICA: ICD-10-CM

## 2018-04-02 PROCEDURE — 98940 CHIROPRACT MANJ 1-2 REGIONS: CPT | Mod: AT | Performed by: CHIROPRACTOR

## 2018-04-02 NOTE — MR AVS SNAPSHOT
"              After Visit Summary   2018    Edgar Garrett    MRN: [id number]           Patient Information     Date Of Birth          1995        Visit Information        Provider Department      2018 1:30 PM [facility]        Today's Diagnoses     Segmental and somatic dysfunction of lumbar region    -  1    Acute bilateral low back pain without sciatica        Segmental and somatic dysfunction of thoracic region           Follow-ups after your visit        Who to contact     If you have questions or need follow up information about today's clinic visit or your schedule please contact  [facility] directly at [phone].  Normal or non-critical lab and imaging results will be communicated to you by MyChart, letter or phone within 4 business days after the clinic has received the results. If you do not hear from us within 7 days, please contact the clinic through [employer] or phone. If you have a critical or abnormal lab result, we will notify you by phone as soon as possible.  Submit refill requests through [employer] or call your pharmacy and they will forward the refill request to us. Please allow 3 business days for your refill to be completed.          Additional Information About Your Visit        MyChart Information     [employer] lets you send messages to your doctor, view your test results, renew your prescriptions, schedule appointments and more. To sign up, go to [email or web address] . Click on \"Log in\" on the left side of the screen, which will take you to the Welcome page. Then click on \"Sign up Now\" on the right side of the page.     You will be asked to enter the access code listed below, as well as some personal information. Please follow the directions to create your username and password.     Your access code is: [email or web address]  Expires: 2018  3:15 AM     Your access code will  in 90 days. If you need help or a new code, please call " your [place] clinic or [phone].        Care EveryWhere ID     This is your Care EveryWhere ID. This could be used by other organizations to access your [place] medical records  [id number]         Blood Pressure from Last 3 Encounters:   01/28/18 147/85   08/05/16 159/89   02/10/15 134/85    Weight from Last 3 Encounters:   09/11/13 266 lb (120.7 kg) (>99 %)*     * Growth percentiles are based on [facility] 2-20 Years data.              We Performed the Following     CHIROPRAC MANIP,SPINAL,1-2 REGIONS        Primary Care Provider Office Phone # Fax #    Yevgeniy Taylor [phone]       [facility] [address] [facility] [address]        Equal Access to Services     [facility] : Hadii chrissie gibbs hadasho Soomaali, waaxda luqadaha, qaybta kaalmada adeegyada, skyler diop . So [facility] [phone].    ATENCIÓN: Si habla español, tiene a johnson disposición servicios gratuitos de asistencia lingüística. Llame al [phone].    We comply with applicable federal civil rights laws and [place] laws. We do not discriminate on the basis of race, color, national origin, age, disability, sex, sexual orientation, or gender identity.            Thank you!     Thank you for choosing  [facility]  for your care. Our goal is always to provide you with excellent care. Hearing back from our patients is one way we can continue to improve our services. Please take a few minutes to complete the written survey that you may receive in the mail after your visit with us. Thank you!             Your Updated Medication List - Protect others around you: Learn how to safely use, store and throw away your medicines at www.disposemymeds.org.          This list is accurate as of 4/2/18  1:43 PM.  Always use your most recent med list.                   Brand Name Dispense Instructions for use Diagnosis    BENADRYL ALLERGY PO      Take 1 tablet by mouth daily as needed. Pt took one tab today hoping  it would help his chest pain        cyclobenzaprine 10 MG tablet    FLEXERIL    20 tablet    Take 1 tablet (10 mg) by mouth 3 times daily as needed for muscle spasms        IBUPROFEN PO      Take 400 mg by mouth        KLONOPIN PO      Take 1 mg by mouth as needed        oxyCODONE IR 5 MG tablet    ROXICODONE    20 tablet    Take 1 tablet (5 mg) by mouth every 6 hours as needed for pain        PRILOSEC PO      Take 40 mg by mouth as needed

## 2018-04-02 NOTE — PROGRESS NOTES
Subjective Finding:    Chief compalint: Patient presents with:  Back Pain  , Pain Scale: 6/10, Intensity: sharp, Duration: 2 days, Radiating: down right leg.    Date of injury:     Activities that the pain restricts:   Home/household/hobbies/social activities: yes.  Work duties: yes.  Sleep: yes.  Makes symptoms better: rest.  Makes symptoms worse: activity.  Have you seen anyone else for the symptoms? Yes: MD.  Work related: no.  Automobile related injury: no.    Objective and Assessment:    Posture Analysis:   High shoulder: .  Head tilt: .  High iliac crest: right.  Head carriage: neutral.  Thoracic Kyphosis: forward.  Lumbar Lordosis: forward.    Lumbar Range of Motion: extension decreased, left lateral flexion decreased and right lateral flexion decreased.  Cervical Range of Motion: .  Thoracic Range of Motion: extension decreased.  Extremity Range of Motion: .    Palpation:   Quad lumb: bilateral, referred pain: yes    Segmental dysfunction pre-treatment and treatment area: T11, L5 and PSIS Right.    Assessment post-treatment:  Cervical: .  Thoracic: ROM increased.  Lumbar: ROM increased.    Comments: .      Complicating Factors: .    Procedure(s):  CMT:  61014 Chiropractic manipulative treatment 1-2 regions performed   Thoracic: Diversified, See above for level, Prone and Lumbar: Diversified, See above for level, Side posture    Modalities:  None performed this visit    Therapeutic procedures:  None    Plan:  Treatment plan: 2 times per week for 2 weeks.  Instructed patient: stretch as instructed at visit.  Short term goals: increase ROM.  Long term goals: increase ADL.  Prognosis: very good.

## 2018-05-03 ENCOUNTER — OFFICE VISIT (OUTPATIENT)
Dept: CHIROPRACTIC MEDICINE | Facility: OTHER | Age: 23
End: 2018-05-03
Attending: CHIROPRACTOR
Payer: COMMERCIAL

## 2018-05-03 DIAGNOSIS — M99.03 SEGMENTAL AND SOMATIC DYSFUNCTION OF LUMBAR REGION: Primary | ICD-10-CM

## 2018-05-03 DIAGNOSIS — M99.02 SEGMENTAL AND SOMATIC DYSFUNCTION OF THORACIC REGION: ICD-10-CM

## 2018-05-03 DIAGNOSIS — M54.50 ACUTE BILATERAL LOW BACK PAIN WITHOUT SCIATICA: ICD-10-CM

## 2018-05-03 PROCEDURE — 98940 CHIROPRACT MANJ 1-2 REGIONS: CPT | Mod: AT | Performed by: CHIROPRACTOR

## 2018-05-03 NOTE — MR AVS SNAPSHOT
"              After Visit Summary   5/3/2018    Edgar Garrett    MRN: [id number]           Patient Information     Date Of Birth          1995        Visit Information        Provider Department      5/3/2018 2:20 PM [facility]        Today's Diagnoses     Segmental and somatic dysfunction of lumbar region    -  1    Acute bilateral low back pain without sciatica        Segmental and somatic dysfunction of thoracic region           Follow-ups after your visit        Who to contact     If you have questions or need follow up information about today's clinic visit or your schedule please contact  [facility] at [phone].  Normal or non-critical lab and imaging results will be communicated to you by MyChart, letter or phone within 4 business days after the clinic has received the results. If you do not hear from us within 7 days, please contact the clinic through Tapticahart or phone. If you have a critical or abnormal lab result, we will notify you by phone as soon as possible.  Submit refill requests through [employer] or call your pharmacy and they will forward the refill request to us. Please allow 3 business days for your refill to be completed.          Additional Information About Your Visit        MyChart Information     [employer] lets you send messages to your doctor, view your test results, renew your prescriptions, schedule appointments and more. To sign up, go to [email or web address] . Click on \"Log in\" on the left side of the screen, which will take you to the Welcome page. Then click on \"Sign up Now\" on the right side of the page.     You will be asked to enter the access code listed below, as well as some personal information. Please follow the directions to create your username and password.     Your access code is: [email or web address]  Expires: 2018  4:35 PM     Your access code will  in 90 days. If you need help or a new code, please call " your [place] clinic or [phone].        Care EveryWhere ID     This is your Care EveryWhere ID. This could be used by other organizations to access your [place] medical records  [id number]         Blood Pressure from Last 3 Encounters:   01/28/18 147/85   08/05/16 159/89   02/10/15 134/85    Weight from Last 3 Encounters:   09/11/13 266 lb (120.7 kg) (>99 %)*     * Growth percentiles are based on [facility] 2-20 Years data.              We Performed the Following     CHIROPRAC MANIP,SPINAL,1-2 REGIONS        Primary Care Provider Office Phone # Fax #    Yevgeniy Taylor [phone]       [facility] [address] [facility] [address]        Equal Access to Services     [facility] : Hadii chrissie gibbs hadasho Soomaali, waaxda luqadaha, qaybta kaalmada adeegyada, skyler diop . So [facility] [phone].    ATENCIÓN: Si habla español, tiene a johnson disposición servicios gratuitos de asistencia lingüística. Llame al [phone].    We comply with applicable federal civil rights laws and [place] laws. We do not discriminate on the basis of race, color, national origin, age, disability, sex, sexual orientation, or gender identity.            Thank you!     Thank you for choosing  [facility]  for your care. Our goal is always to provide you with excellent care. Hearing back from our patients is one way we can continue to improve our services. Please take a few minutes to complete the written survey that you may receive in the mail after your visit with us. Thank you!             Your Updated Medication List - Protect others around you: Learn how to safely use, store and throw away your medicines at www.disposemymeds.org.          This list is accurate as of 5/3/18  4:35 PM.  Always use your most recent med list.                   Brand Name Dispense Instructions for use Diagnosis    BENADRYL ALLERGY PO      Take 1 tablet by mouth daily as needed. Pt took one tab today hoping  it would help his chest pain        cyclobenzaprine 10 MG tablet    FLEXERIL    20 tablet    Take 1 tablet (10 mg) by mouth 3 times daily as needed for muscle spasms        IBUPROFEN PO      Take 400 mg by mouth        KLONOPIN PO      Take 1 mg by mouth as needed        oxyCODONE IR 5 MG tablet    ROXICODONE    20 tablet    Take 1 tablet (5 mg) by mouth every 6 hours as needed for pain        PRILOSEC PO      Take 40 mg by mouth as needed

## 2018-05-03 NOTE — PROGRESS NOTES
Subjective Finding:    Chief compalint: Patient presents with:  Back Pain  , Pain Scale: 6/10, Intensity: sharp, Duration: 2 days, Radiating: down right leg.    Date of injury:     Activities that the pain restricts:   Home/household/hobbies/social activities: yes.  Work duties: yes.  Sleep: yes.  Makes symptoms better: rest.  Makes symptoms worse: activity.  Have you seen anyone else for the symptoms? Yes: MD.  Work related: no.  Automobile related injury: no.    Objective and Assessment:    Posture Analysis:   High shoulder: .  Head tilt: .  High iliac crest: right.  Head carriage: neutral.  Thoracic Kyphosis: forward.  Lumbar Lordosis: forward.    Lumbar Range of Motion: extension decreased, left lateral flexion decreased and right lateral flexion decreased.  Cervical Range of Motion: .  Thoracic Range of Motion: extension decreased.  Extremity Range of Motion: .    Palpation:   Quad lumb: bilateral, referred pain: yes    Segmental dysfunction pre-treatment and treatment area: T11, L5 and PSIS Right.    Assessment post-treatment:  Cervical: .  Thoracic: ROM increased.  Lumbar: ROM increased.    Comments: .      Complicating Factors: .    Procedure(s):  CMT:  67044 Chiropractic manipulative treatment 1-2 regions performed   Thoracic: Diversified, See above for level, Prone and Lumbar: Diversified, See above for level, Side posture    Modalities:  None performed this visit    Therapeutic procedures:  None    Plan:  Treatment plan: 2 times per week for 2 weeks.  Instructed patient: stretch as instructed at visit.  Short term goals: increase ROM.  Long term goals: increase ADL.  Prognosis: very good.

## 2018-05-15 ENCOUNTER — OFFICE VISIT (OUTPATIENT)
Dept: CHIROPRACTIC MEDICINE | Facility: OTHER | Age: 23
End: 2018-05-15
Attending: CHIROPRACTOR
Payer: COMMERCIAL

## 2018-05-15 DIAGNOSIS — M99.03 SEGMENTAL AND SOMATIC DYSFUNCTION OF LUMBAR REGION: Primary | ICD-10-CM

## 2018-05-15 DIAGNOSIS — M99.02 SEGMENTAL AND SOMATIC DYSFUNCTION OF THORACIC REGION: ICD-10-CM

## 2018-05-15 DIAGNOSIS — M54.50 ACUTE BILATERAL LOW BACK PAIN WITHOUT SCIATICA: ICD-10-CM

## 2018-05-15 PROCEDURE — 98940 CHIROPRACT MANJ 1-2 REGIONS: CPT | Mod: AT | Performed by: CHIROPRACTOR

## 2018-05-15 NOTE — MR AVS SNAPSHOT
"              After Visit Summary   5/15/2018    Edgar Garrett    MRN: [id number]           Patient Information     Date Of Birth          1995        Visit Information        Provider Department      5/15/2018 12:20 PM Sha[facility]ulty [facility]        Today's Diagnoses     Segmental and somatic dysfunction of lumbar region    -  1    Acute bilateral low back pain without sciatica        Segmental and somatic dysfunction of thoracic region           Follow-ups after your visit        Who to contact     If you have questions or need follow up information about today's clinic visit or your schedule please contact  [facility] directly at [phone].  Normal or non-critical lab and imaging results will be communicated to you by MyChart, letter or phone within 4 business days after the clinic has received the results. If you do not hear from us within 7 days, please contact the clinic through Jana Mobilehart or phone. If you have a critical or abnormal lab result, we will notify you by phone as soon as possible.  Submit refill requests through [employer] or call your pharmacy and they will forward the refill request to us. Please allow 3 business days for your refill to be completed.          Additional Information About Your Visit        MyChart Information     [employer] lets you send messages to your doctor, view your test results, renew your prescriptions, schedule appointments and more. To sign up, go to [email or web address] . Click on \"Log in\" on the left side of the screen, which will take you to the Welcome page. Then click on \"Sign up Now\" on the right side of the page.     You will be asked to enter the access code listed below, as well as some personal information. Please follow the directions to create your username and password.     Your access code is: [email or web address]  Expires: 2018  4:35 PM     Your access code will  in 90 days. If you need help or a new code, please call " your [place] clinic or [phone].        Care EveryWhere ID     This is your Care EveryWhere ID. This could be used by other organizations to access your [place] medical records  [id number]         Blood Pressure from Last 3 Encounters:   01/28/18 147/85   08/05/16 159/89   02/10/15 134/85    Weight from Last 3 Encounters:   09/11/13 266 lb (120.7 kg) (>99 %)*     * Growth percentiles are based on [facility] 2-20 Years data.              We Performed the Following     CHIROPRAC MANIP,SPINAL,1-2 REGIONS        Primary Care Provider Office Phone # Fax #    Yevgeniy Taylor [phone]       [facility] [address] [facility] [address]        Equal Access to Services     CHAZ CARRANZA : Hadii chrissie gibbs hadasho Soomaali, waaxda luqadaha, qaybta kaalmada adeegyada, skyler diop . So [facility] [phone].    ATENCIÓN: Si habla español, tiene a johnson disposición servicios gratuitos de asistencia lingüística. Llame al [phone].    We comply with applicable federal civil rights laws and [place] laws. We do not discriminate on the basis of race, color, national origin, age, disability, sex, sexual orientation, or gender identity.            Thank you!     Thank you for choosing  [facility]  for your care. Our goal is always to provide you with excellent care. Hearing back from our patients is one way we can continue to improve our services. Please take a few minutes to complete the written survey that you may receive in the mail after your visit with us. Thank you!             Your Updated Medication List - Protect others around you: Learn how to safely use, store and throw away your medicines at www.disposemymeds.org.          This list is accurate as of 5/15/18 11:59 PM.  Always use your most recent med list.                   Brand Name Dispense Instructions for use Diagnosis    BENADRYL ALLERGY PO      Take 1 tablet by mouth daily as needed. Pt took one tab today hoping  it would help his chest pain        cyclobenzaprine 10 MG tablet    FLEXERIL    20 tablet    Take 1 tablet (10 mg) by mouth 3 times daily as needed for muscle spasms        IBUPROFEN PO      Take 400 mg by mouth        KLONOPIN PO      Take 1 mg by mouth as needed        oxyCODONE IR 5 MG tablet    ROXICODONE    20 tablet    Take 1 tablet (5 mg) by mouth every 6 hours as needed for pain        PRILOSEC PO      Take 40 mg by mouth as needed

## 2018-05-16 NOTE — PROGRESS NOTES
Subjective Finding:    Chief compalint: Patient presents with:  Back Pain  , Pain Scale: 6/10, Intensity: sharp, Duration: 2 days, Radiating: down right leg.    Date of injury:     Activities that the pain restricts:   Home/household/hobbies/social activities: yes.  Work duties: yes.  Sleep: yes.  Makes symptoms better: rest.  Makes symptoms worse: activity.  Have you seen anyone else for the symptoms? Yes: MD.  Work related: no.  Automobile related injury: no.    Objective and Assessment:    Posture Analysis:   High shoulder: .  Head tilt: .  High iliac crest: right.  Head carriage: neutral.  Thoracic Kyphosis: forward.  Lumbar Lordosis: forward.    Lumbar Range of Motion: extension decreased, left lateral flexion decreased and right lateral flexion decreased.  Cervical Range of Motion: .  Thoracic Range of Motion: extension decreased.  Extremity Range of Motion: .    Palpation:   Quad lumb: bilateral, referred pain: yes    Segmental dysfunction pre-treatment and treatment area: T11, L5 and PSIS Right.    Assessment post-treatment:  Cervical: .  Thoracic: ROM increased.  Lumbar: ROM increased.    Comments: .      Complicating Factors: .    Procedure(s):  CMT:  28323 Chiropractic manipulative treatment 1-2 regions performed   Thoracic: Diversified, See above for level, Prone and Lumbar: Diversified, See above for level, Side posture    Modalities:  None performed this visit    Therapeutic procedures:  None    Plan:  Treatment plan: 2 times per week for 2 weeks.  Instructed patient: stretch as instructed at visit.  Short term goals: increase ROM.  Long term goals: increase ADL.  Prognosis: very good.

## 2018-05-30 ENCOUNTER — OFFICE VISIT (OUTPATIENT)
Dept: CHIROPRACTIC MEDICINE | Facility: OTHER | Age: 23
End: 2018-05-30
Attending: CHIROPRACTOR
Payer: COMMERCIAL

## 2018-05-30 DIAGNOSIS — M99.02 SEGMENTAL AND SOMATIC DYSFUNCTION OF THORACIC REGION: ICD-10-CM

## 2018-05-30 DIAGNOSIS — M54.50 ACUTE RIGHT-SIDED LOW BACK PAIN WITHOUT SCIATICA: ICD-10-CM

## 2018-05-30 DIAGNOSIS — M99.03 SEGMENTAL AND SOMATIC DYSFUNCTION OF LUMBAR REGION: Primary | ICD-10-CM

## 2018-05-30 PROCEDURE — 98940 CHIROPRACT MANJ 1-2 REGIONS: CPT | Mod: AT | Performed by: CHIROPRACTOR

## 2018-05-30 NOTE — MR AVS SNAPSHOT
"              After Visit Summary   2018    Edgar Garrett    MRN: [id number]           Patient Information     Date Of Birth          1995        Visit Information        Provider Department      2018 1:20 PM Shaheed Mcnulty DC  [facility]        Today's Diagnoses     Segmental and somatic dysfunction of lumbar region    -  1    Acute right-sided low back pain without sciatica        Segmental and somatic dysfunction of thoracic region           Follow-ups after your visit        Who to contact     If you have questions or need follow up information about today's clinic visit or your schedule please contact  [facility] directly at [phone].  Normal or non-critical lab and imaging results will be communicated to you by Dashhart, letter or phone within 4 business days after the clinic has received the results. If you do not hear from us within 7 days, please contact the clinic through Dashhart or phone. If you have a critical or abnormal lab result, we will notify you by phone as soon as possible.  Submit refill requests through Number 100 or call your pharmacy and they will forward the refill request to us. Please allow 3 business days for your refill to be completed.          Additional Information About Your Visit        MyChart Information     Number 100 lets you send messages to your doctor, view your test results, renew your prescriptions, schedule appointments and more. To sign up, go to [email or web address] . Click on \"Log in\" on the left side of the screen, which will take you to the Welcome page. Then click on \"Sign up Now\" on the right side of the page.     You will be asked to enter the access code listed below, as well as some personal information. Please follow the directions to create your username and password.     Your access code is: [email or web address]  Expires: 2018  4:35 PM     Your access code will  in 90 days. If you need help or a new code, please " call your [place] clinic or [phone].        Care EveryWhere ID     This is your Care EveryWhere ID. This could be used by other organizations to access your [place] medical records  [id number]         Blood Pressure from Last 3 Encounters:   01/28/18 147/85   08/05/16 159/89   02/10/15 134/85    Weight from Last 3 Encounters:   09/11/13 266 lb (120.7 kg) (>99 %)*     * Growth percentiles are based on [facility][note 2] 2-20 Years data.              We Performed the Following     CHIROPRAC MANIP,SPINAL,1-2 REGIONS        Primary Care Provider Office Phone # Fax #    Yevgeniy Taylor [phone]       [facility] [address] [facility] [address]        Equal Access to Services     CHAZ CARRANZA : Hadii chrissie gibbs hadasho Soomaali, waaxda luqadaha, qaybta kaalmada adeegyada, skyler diop . So [facility] [phone].    ATENCIÓN: Si habla español, tiene a johnson disposición servicios gratuitos de asistencia lingüística. Llame al [phone].    We comply with applicable federal civil rights laws and [place] laws. We do not discriminate on the basis of race, color, national origin, age, disability, sex, sexual orientation, or gender identity.            Thank you!     Thank you for choosing  [facility]  for your care. Our goal is always to provide you with excellent care. Hearing back from our patients is one way we can continue to improve our services. Please take a few minutes to complete the written survey that you may receive in the mail after your visit with us. Thank you!             Your Updated Medication List - Protect others around you: Learn how to safely use, store and throw away your medicines at www.disposemymeds.org.          This list is accurate as of 5/30/18  3:19 PM.  Always use your most recent med list.                   Brand Name Dispense Instructions for use Diagnosis    BENADRYL ALLERGY PO      Take 1 tablet by mouth daily as needed. Pt took one tab today  hoping it would help his chest pain        cyclobenzaprine 10 MG tablet    FLEXERIL    20 tablet    Take 1 tablet (10 mg) by mouth 3 times daily as needed for muscle spasms        IBUPROFEN PO      Take 400 mg by mouth        KLONOPIN PO      Take 1 mg by mouth as needed        oxyCODONE IR 5 MG tablet    ROXICODONE    20 tablet    Take 1 tablet (5 mg) by mouth every 6 hours as needed for pain        PRILOSEC PO      Take 40 mg by mouth as needed

## 2018-05-30 NOTE — PROGRESS NOTES
Subjective Finding:    Chief compalint: Patient presents with:  Back Pain  , Pain Scale: 6/10, Intensity: sharp, Duration: 2 days, Radiating: down right leg.    Date of injury:     Activities that the pain restricts:   Home/household/hobbies/social activities: yes.  Work duties: yes.  Sleep: yes.  Makes symptoms better: rest.  Makes symptoms worse: activity.  Have you seen anyone else for the symptoms? Yes: MD.  Work related: no.  Automobile related injury: no.    Objective and Assessment:    Posture Analysis:   High shoulder: .  Head tilt: .  High iliac crest: right.  Head carriage: neutral.  Thoracic Kyphosis: forward.  Lumbar Lordosis: forward.    Lumbar Range of Motion: extension decreased, left lateral flexion decreased and right lateral flexion decreased.  Cervical Range of Motion: .  Thoracic Range of Motion: extension decreased.  Extremity Range of Motion: .    Palpation:   Quad lumb: bilateral, referred pain: yes    Segmental dysfunction pre-treatment and treatment area: T11, L5 and PSIS Right.    Assessment post-treatment:  Cervical: .  Thoracic: ROM increased.  Lumbar: ROM increased.    Comments: .      Complicating Factors: .    Procedure(s):  CMT:  29999 Chiropractic manipulative treatment 1-2 regions performed   Thoracic: Diversified, See above for level, Prone and Lumbar: Diversified, See above for level, Side posture    Modalities:  None performed this visit    Therapeutic procedures:  None    Plan:  Treatment plan: 2 times per week for 2 weeks.  Instructed patient: stretch as instructed at visit.  Short term goals: increase ROM.  Long term goals: increase ADL.  Prognosis: very good.

## 2018-06-06 ENCOUNTER — OFFICE VISIT (OUTPATIENT)
Dept: CHIROPRACTIC MEDICINE | Facility: OTHER | Age: 23
End: 2018-06-06
Attending: CHIROPRACTOR
Payer: COMMERCIAL

## 2018-06-06 DIAGNOSIS — M54.50 ACUTE BILATERAL LOW BACK PAIN WITHOUT SCIATICA: ICD-10-CM

## 2018-06-06 DIAGNOSIS — M99.02 SEGMENTAL AND SOMATIC DYSFUNCTION OF THORACIC REGION: ICD-10-CM

## 2018-06-06 DIAGNOSIS — M99.03 SEGMENTAL AND SOMATIC DYSFUNCTION OF LUMBAR REGION: Primary | ICD-10-CM

## 2018-06-06 PROCEDURE — 98940 CHIROPRACT MANJ 1-2 REGIONS: CPT | Mod: AT | Performed by: CHIROPRACTOR

## 2018-06-06 NOTE — MR AVS SNAPSHOT
"              After Visit Summary   2018    Edgar Garrett    MRN: [id number]           Patient Information     Date Of Birth          1995        Visit Information        Provider Department      2018 1:10 PM [facility]        Today's Diagnoses     Segmental and somatic dysfunction of lumbar region    -  1    Acute bilateral low back pain without sciatica        Segmental and somatic dysfunction of thoracic region           Follow-ups after your visit        Who to contact     If you have questions or need follow up information about today's clinic visit or your schedule please contact  [facility] directly at [phone].  Normal or non-critical lab and imaging results will be communicated to you by MyChart, letter or phone within 4 business days after the clinic has received the results. If you do not hear from us within 7 days, please contact the clinic through Sirihart or phone. If you have a critical or abnormal lab result, we will notify you by phone as soon as possible.  Submit refill requests through FONU2 or call your pharmacy and they will forward the refill request to us. Please allow 3 business days for your refill to be completed.          Additional Information About Your Visit        MyChart Information     FONU2 lets you send messages to your doctor, view your test results, renew your prescriptions, schedule appointments and more. To sign up, go to [email or web address] . Click on \"Log in\" on the left side of the screen, which will take you to the Welcome page. Then click on \"Sign up Now\" on the right side of the page.     You will be asked to enter the access code listed below, as well as some personal information. Please follow the directions to create your username and password.     Your access code is: [email or web address]  Expires: 2018  4:35 PM     Your access code will  in 90 days. If you need help or a new code, please call " your [place] clinic or [phone].        Care EveryWhere ID     This is your Care EveryWhere ID. This could be used by other organizations to access your [place] medical records  [id number]         Blood Pressure from Last 3 Encounters:   01/28/18 147/85   08/05/16 159/89   02/10/15 134/85    Weight from Last 3 Encounters:   09/11/13 266 lb (120.7 kg) (>99 %)*     * Growth percentiles are based on [facility] 2-20 Years data.              We Performed the Following     CHIROPRAC MANIP,SPINAL,1-2 REGIONS        Primary Care Provider Office Phone # Fax #    Yevgeniy Taylor [phone]       [facility] [address] [facility] [address]        Equal Access to Services     CHAZ CARRANZA : Hadii chrissie gibbs hadasho Soomaali, waaxda luqadaha, qaybta kaalmada adeegyada, skyler diop . So [facility] [phone].    ATENCIÓN: Si habla español, tiene a johnson disposición servicios gratuitos de asistencia lingüística. Llame al [phone].    We comply with applicable federal civil rights laws and [place] laws. We do not discriminate on the basis of race, color, national origin, age, disability, sex, sexual orientation, or gender identity.            Thank you!     Thank you for choosing  [facility]  for your care. Our goal is always to provide you with excellent care. Hearing back from our patients is one way we can continue to improve our services. Please take a few minutes to complete the written survey that you may receive in the mail after your visit with us. Thank you!             Your Updated Medication List - Protect others around you: Learn how to safely use, store and throw away your medicines at www.disposemymeds.org.          This list is accurate as of 6/6/18  2:30 PM.  Always use your most recent med list.                   Brand Name Dispense Instructions for use Diagnosis    BENADRYL ALLERGY PO      Take 1 tablet by mouth daily as needed. Pt took one tab today hoping  it would help his chest pain        cyclobenzaprine 10 MG tablet    FLEXERIL    20 tablet    Take 1 tablet (10 mg) by mouth 3 times daily as needed for muscle spasms        IBUPROFEN PO      Take 400 mg by mouth        KLONOPIN PO      Take 1 mg by mouth as needed        oxyCODONE IR 5 MG tablet    ROXICODONE    20 tablet    Take 1 tablet (5 mg) by mouth every 6 hours as needed for pain        PRILOSEC PO      Take 40 mg by mouth as needed

## 2018-06-06 NOTE — PROGRESS NOTES
Subjective Finding:    Chief compalint: Patient presents with:  Back Pain  , Pain Scale: 6/10, Intensity: sharp, Duration: 2 days, Radiating: down right leg.    Date of injury:     Activities that the pain restricts:   Home/household/hobbies/social activities: yes.  Work duties: yes.  Sleep: yes.  Makes symptoms better: rest.  Makes symptoms worse: activity.  Have you seen anyone else for the symptoms? Yes: MD.  Work related: no.  Automobile related injury: no.    Objective and Assessment:    Posture Analysis:   High shoulder: .  Head tilt: .  High iliac crest: right.  Head carriage: neutral.  Thoracic Kyphosis: forward.  Lumbar Lordosis: forward.    Lumbar Range of Motion: extension decreased, left lateral flexion decreased and right lateral flexion decreased.  Cervical Range of Motion: .  Thoracic Range of Motion: extension decreased.  Extremity Range of Motion: .    Palpation:   Quad lumb: bilateral, referred pain: yes    Segmental dysfunction pre-treatment and treatment area: T11, L5 and PSIS Right.    Assessment post-treatment:  Cervical: .  Thoracic: ROM increased.  Lumbar: ROM increased.    Comments: .      Complicating Factors: .    Procedure(s):  CMT:  41756 Chiropractic manipulative treatment 1-2 regions performed   Thoracic: Diversified, See above for level, Prone and Lumbar: Diversified, See above for level, Side posture    Modalities:  None performed this visit    Therapeutic procedures:  None    Plan:  Treatment plan: 2 times per week for 2 weeks.  Instructed patient: stretch as instructed at visit.  Short term goals: increase ROM.  Long term goals: increase ADL.  Prognosis: very good.

## 2018-07-18 ENCOUNTER — OFFICE VISIT (OUTPATIENT)
Dept: CHIROPRACTIC MEDICINE | Facility: OTHER | Age: 23
End: 2018-07-18
Attending: CHIROPRACTOR
Payer: COMMERCIAL

## 2018-07-18 DIAGNOSIS — M54.50 ACUTE RIGHT-SIDED LOW BACK PAIN WITHOUT SCIATICA: ICD-10-CM

## 2018-07-18 DIAGNOSIS — M99.03 SEGMENTAL AND SOMATIC DYSFUNCTION OF LUMBAR REGION: Primary | ICD-10-CM

## 2018-07-18 DIAGNOSIS — M99.02 SEGMENTAL AND SOMATIC DYSFUNCTION OF THORACIC REGION: ICD-10-CM

## 2018-07-18 PROCEDURE — 98940 CHIROPRACT MANJ 1-2 REGIONS: CPT | Mod: AT | Performed by: CHIROPRACTOR

## 2018-07-18 NOTE — MR AVS SNAPSHOT
"              After Visit Summary   2018    Edgar Garrett    MRN: [id number]           Patient Information     Date Of Birth          1995        Visit Information        Provider Department      2018 2:30 PM Shaheed Mcnulty DC  [facility]        Today's Diagnoses     Segmental and somatic dysfunction of lumbar region    -  1    Acute right-sided low back pain without sciatica        Segmental and somatic dysfunction of thoracic region           Follow-ups after your visit        Who to contact     If you have questions or need follow up information about today's clinic visit or your schedule please contact  [facility] directly at [phone].  Normal or non-critical lab and imaging results will be communicated to you by [employer], letter or phone within 4 business days after the clinic has received the results. If you do not hear from us within 7 days, please contact the clinic through [employer] or phone. If you have a critical or abnormal lab result, we will notify you by phone as soon as possible.  Submit refill requests through [employer] or call your pharmacy and they will forward the refill request to us. Please allow 3 business days for your refill to be completed.          Additional Information About Your Visit        MyChart Information     [employer] lets you send messages to your doctor, view your test results, renew your prescriptions, schedule appointments and more. To sign up, go to [email or web address] . Click on \"Log in\" on the left side of the screen, which will take you to the Welcome page. Then click on \"Sign up Now\" on the right side of the page.     You will be asked to enter the access code listed below, as well as some personal information. Please follow the directions to create your username and password.     Your access code is: [email or web address]  Expires: 2018  4:35 PM     Your access code will  in 90 days. If you need help or a new code, please " call your [place] clinic or [phone].        Care EveryWhere ID     This is your Care EveryWhere ID. This could be used by other organizations to access your [place] medical records  [id number]         Blood Pressure from Last 3 Encounters:   01/28/18 147/85   08/05/16 159/89   02/10/15 134/85    Weight from Last 3 Encounters:   09/11/13 266 lb (120.7 kg) (>99 %)*     * Growth percentiles are based on [facility] 2-20 Years data.              We Performed the Following     CHIROPRAC MANIP,SPINAL,1-2 REGIONS        Primary Care Provider Office Phone # Fax #    Yevgeniy Taylor [phone]       [facility] [address] [facility] [address]        Equal Access to Services     CHAZ CARRANZA : Hadii chrissie gibbs hadasho Soomaali, waaxda luqadaha, qaybta kaalmada adeegyada, skyler diop . So [facility] [phone].    ATENCIÓN: Si habla español, tiene a johnson disposición servicios gratuitos de asistencia lingüística. Llame al [phone].    We comply with applicable federal civil rights laws and [place] laws. We do not discriminate on the basis of race, color, national origin, age, disability, sex, sexual orientation, or gender identity.            Thank you!     Thank you for choosing  [facility]  for your care. Our goal is always to provide you with excellent care. Hearing back from our patients is one way we can continue to improve our services. Please take a few minutes to complete the written survey that you may receive in the mail after your visit with us. Thank you!             Your Updated Medication List - Protect others around you: Learn how to safely use, store and throw away your medicines at www.disposemymeds.org.          This list is accurate as of 7/18/18 11:59 PM.  Always use your most recent med list.                   Brand Name Dispense Instructions for use Diagnosis    BENADRYL ALLERGY PO      Take 1 tablet by mouth daily as needed. Pt took one tab today  hoping it would help his chest pain        cyclobenzaprine 10 MG tablet    FLEXERIL    20 tablet    Take 1 tablet (10 mg) by mouth 3 times daily as needed for muscle spasms        IBUPROFEN PO      Take 400 mg by mouth        KLONOPIN PO      Take 1 mg by mouth as needed        oxyCODONE IR 5 MG tablet    ROXICODONE    20 tablet    Take 1 tablet (5 mg) by mouth every 6 hours as needed for pain        PRILOSEC PO      Take 40 mg by mouth as needed

## 2018-07-19 NOTE — PROGRESS NOTES
Subjective Finding:    Chief compalint: Patient presents with:  Back Pain: with some right leg pain  , Pain Scale: 6/10, Intensity: sharp, Duration: 2 days, Radiating: down right leg.    Date of injury:     Activities that the pain restricts:   Home/household/hobbies/social activities: yes.  Work duties: yes.  Sleep: yes.  Makes symptoms better: rest.  Makes symptoms worse: activity.  Have you seen anyone else for the symptoms? Yes: MD.  Work related: no.  Automobile related injury: no.    Objective and Assessment:    Posture Analysis:   High shoulder: .  Head tilt: .  High iliac crest: right.  Head carriage: neutral.  Thoracic Kyphosis: forward.  Lumbar Lordosis: forward.    Lumbar Range of Motion: extension decreased, left lateral flexion decreased and right lateral flexion decreased.  Cervical Range of Motion: .  Thoracic Range of Motion: extension decreased.  Extremity Range of Motion: .    Palpation:   Quad lumb: bilateral, referred pain: yes    Segmental dysfunction pre-treatment and treatment area: T11, L5 and PSIS Right.    Assessment post-treatment:  Cervical: .  Thoracic: ROM increased.  Lumbar: ROM increased.    Comments: .      Complicating Factors: .    Procedure(s):  CMT:  50427 Chiropractic manipulative treatment 1-2 regions performed   Thoracic: Diversified, See above for level, Prone and Lumbar: Diversified, See above for level, Side posture    Modalities:  None performed this visit    Therapeutic procedures:  None    Plan:  Treatment plan: 2 times per week for 2 weeks.  Instructed patient: stretch as instructed at visit.  Short term goals: increase ROM.  Long term goals: increase ADL.  Prognosis: very good.

## 2018-09-19 ENCOUNTER — OFFICE VISIT (OUTPATIENT)
Dept: CHIROPRACTIC MEDICINE | Facility: OTHER | Age: 23
End: 2018-09-19
Attending: CHIROPRACTOR
Payer: COMMERCIAL

## 2018-09-19 DIAGNOSIS — M99.03 SEGMENTAL AND SOMATIC DYSFUNCTION OF LUMBAR REGION: Primary | ICD-10-CM

## 2018-09-19 DIAGNOSIS — M99.02 SEGMENTAL AND SOMATIC DYSFUNCTION OF THORACIC REGION: ICD-10-CM

## 2018-09-19 DIAGNOSIS — M54.50 ACUTE BILATERAL LOW BACK PAIN WITHOUT SCIATICA: ICD-10-CM

## 2018-09-19 PROCEDURE — 98940 CHIROPRACT MANJ 1-2 REGIONS: CPT | Mod: AT | Performed by: CHIROPRACTOR

## 2018-09-19 NOTE — MR AVS SNAPSHOT
"              After Visit Summary   2018    Edgar Garrett    MRN: [id number]           Patient Information     Date Of Birth          1995        Visit Information        Provider Department      2018 1:40 PM Shaheed Mcnulty [facility] [place]        Today's Diagnoses     Segmental and somatic dysfunction of lumbar region    -  1    Acute bilateral low back pain without sciatica        Segmental and somatic dysfunction of thoracic region           Follow-ups after your visit        Who to contact     If you have questions or need follow up information about today's clinic visit or your schedule please contact  [facility] directly at [phone].  Normal or non-critical lab and imaging results will be communicated to you by MyChart, letter or phone within 4 business days after the clinic has received the results. If you do not hear from us within 7 days, please contact the clinic through [employer] or phone. If you have a critical or abnormal lab result, we will notify you by phone as soon as possible.  Submit refill requests through [employer] or call your pharmacy and they will forward the refill request to us. Please allow 3 business days for your refill to be completed.          Additional Information About Your Visit        MyChart Information     [employer] lets you send messages to your doctor, view your test results, renew your prescriptions, schedule appointments and more. To sign up, go to [email or web address] . Click on \"Log in\" on the left side of the screen, which will take you to the Welcome page. Then click on \"Sign up Now\" on the right side of the page.     You will be asked to enter the access code listed below, as well as some personal information. Please follow the directions to create your username and password.     Your access code is: [email or web address]  Expires: 2018  8:58 AM     Your access code will  in 90 days. If you need help or a new code, please " call your [place] clinic or [phone].        Care EveryWhere ID     This is your Care EveryWhere ID. This could be used by other organizations to access your [place] medical records  [id number]         Blood Pressure from Last 3 Encounters:   01/28/18 147/85   08/05/16 159/89   02/10/15 134/85    Weight from Last 3 Encounters:   09/11/13 266 lb (120.7 kg) (>99 %)*     * Growth percentiles are based on [facility] 2-20 Years data.              We Performed the Following     CHIROPRAC MANIP,SPINAL,1-2 REGIONS        Primary Care Provider Office Phone # Fax #    Yevgeniy Taylor [phone]       [facility] [address] [facility] [place] [address]        Equal Access to Services     CHAZ CARRANZA : Hadii chrissie gibbs hadasho Soomaali, waaxda luqadaha, qaybta kaalmada adeegyada, skyler diop . So [facility] [phone].    ATENCIÓN: Si habla español, tiene a johnson disposición servicios gratuitos de asistencia lingüística. Llame al [phone].    We comply with applicable federal civil rights laws and [place] laws. We do not discriminate on the basis of race, color, national origin, age, disability, sex, sexual orientation, or gender identity.            Thank you!     Thank you for choosing  [facility]  for your care. Our goal is always to provide you with excellent care. Hearing back from our patients is one way we can continue to improve our services. Please take a few minutes to complete the written survey that you may receive in the mail after your visit with us. Thank you!             Your Updated Medication List - Protect others around you: Learn how to safely use, store and throw away your medicines at www.disposemymeds.org.          This list is accurate as of 9/19/18 11:59 PM.  Always use your most recent med list.                   Brand Name Dispense Instructions for use Diagnosis    BENADRYL ALLERGY PO      Take 1 tablet by mouth daily as needed. Pt took one tab today  hoping it would help his chest pain        cyclobenzaprine 10 MG tablet    FLEXERIL    20 tablet    Take 1 tablet (10 mg) by mouth 3 times daily as needed for muscle spasms        IBUPROFEN PO      Take 400 mg by mouth        KLONOPIN PO      Take 1 mg by mouth as needed        oxyCODONE IR 5 MG tablet    ROXICODONE    20 tablet    Take 1 tablet (5 mg) by mouth every 6 hours as needed for pain        PRILOSEC PO      Take 40 mg by mouth as needed

## 2018-09-24 NOTE — PROGRESS NOTES
Subjective Finding:    Chief compalint: Patient presents with:  Back Pain  , Pain Scale: 6/10, Intensity: sharp, Duration: 2 days, Radiating: down right leg.    Date of injury:     Activities that the pain restricts:   Home/household/hobbies/social activities: yes.  Work duties: yes.  Sleep: yes.  Makes symptoms better: rest.  Makes symptoms worse: activity.  Have you seen anyone else for the symptoms? Yes: MD.  Work related: no.  Automobile related injury: no.    Objective and Assessment:    Posture Analysis:   High shoulder: .  Head tilt: .  High iliac crest: right.  Head carriage: neutral.  Thoracic Kyphosis: forward.  Lumbar Lordosis: forward.    Lumbar Range of Motion: extension decreased, left lateral flexion decreased and right lateral flexion decreased.  Cervical Range of Motion: .  Thoracic Range of Motion: extension decreased.  Extremity Range of Motion: .    Palpation:   Quad lumb: bilateral, referred pain: yes    Segmental dysfunction pre-treatment and treatment area: T11, L5 and PSIS Right.    Assessment post-treatment:  Cervical: .  Thoracic: ROM increased.  Lumbar: ROM increased.    Comments: .      Complicating Factors: .    Procedure(s):  CMT:  76865 Chiropractic manipulative treatment 1-2 regions performed   Thoracic: Diversified, See above for level, Prone and Lumbar: Diversified, See above for level, Side posture    Modalities:  None performed this visit    Therapeutic procedures:  None    Plan:  Treatment plan: 2 times per week for 2 weeks.  Instructed patient: stretch as instructed at visit.  Short term goals: increase ROM.  Long term goals: increase ADL.  Prognosis: very good.

## 2018-10-15 ENCOUNTER — OFFICE VISIT (OUTPATIENT)
Dept: CHIROPRACTIC MEDICINE | Facility: OTHER | Age: 23
End: 2018-10-15
Attending: CHIROPRACTOR
Payer: COMMERCIAL

## 2018-10-15 DIAGNOSIS — M54.41 ACUTE RIGHT-SIDED LOW BACK PAIN WITH RIGHT-SIDED SCIATICA: ICD-10-CM

## 2018-10-15 DIAGNOSIS — M99.03 SEGMENTAL AND SOMATIC DYSFUNCTION OF LUMBAR REGION: Primary | ICD-10-CM

## 2018-10-15 DIAGNOSIS — M99.02 SEGMENTAL AND SOMATIC DYSFUNCTION OF THORACIC REGION: ICD-10-CM

## 2018-10-15 PROCEDURE — 98940 CHIROPRACT MANJ 1-2 REGIONS: CPT | Mod: AT | Performed by: CHIROPRACTOR

## 2018-10-15 NOTE — MR AVS SNAPSHOT
"              After Visit Summary   10/15/2018    Edgar Garrett    MRN: [id number]           Patient Information     Date Of Birth          1995        Visit Information        Provider Department      10/15/2018 9:00 AM Shaheed Mcnulty DC  [facility]        Today's Diagnoses     Segmental and somatic dysfunction of lumbar region    -  1    Acute right-sided low back pain with right-sided sciatica        Segmental and somatic dysfunction of thoracic region           Follow-ups after your visit        Who to contact     If you have questions or need follow up information about today's clinic visit or your schedule please contact  [facility] directly at [phone].  Normal or non-critical lab and imaging results will be communicated to you by [email or web address], letter or phone within 4 business days after the clinic has received the results. If you do not hear from us within 7 days, please contact the clinic through [email or web address] or phone. If you have a critical or abnormal lab result, we will notify you by phone as soon as possible.  Submit refill requests through [employer] or call your pharmacy and they will forward the refill request to us. Please allow 3 business days for your refill to be completed.          Additional Information About Your Visit        MyChart Information     [employer] lets you send messages to your doctor, view your test results, renew your prescriptions, schedule appointments and more. To sign up, go to [email or web address] . Click on \"Log in\" on the left side of the screen, which will take you to the Welcome page. Then click on \"Sign up Now\" on the right side of the page.     You will be asked to enter the access code listed below, as well as some personal information. Please follow the directions to create your username and password.     Your access code is: [email or web address]  Expires: 2018  8:58 AM     Your access code will  in 90 days. If you need help or a new " code, please call your [place] clinic or [phone].        Care EveryWhere ID     This is your Care EveryWhere ID. This could be used by other organizations to access your [place] medical records  [id number]         Blood Pressure from Last 3 Encounters:   01/28/18 147/85   08/05/16 159/89   02/10/15 134/85    Weight from Last 3 Encounters:   09/11/13 266 lb (120.7 kg) (>99 %)*     * Growth percentiles are based on [facility] 2-20 Years data.              We Performed the Following     CHIROPRAC MANIP,SPINAL,1-2 REGIONS        Primary Care Provider Office Phone # Fax #    Yevgeniy Taylor [phone]       [facility] [address] [facility] [address]        Equal Access to Services     CHAZ CARRANZA : Hadii aad ku hadasho Soomaali, waaxda luqadaha, qaybta kaalmada adeegyada, waxclarence gallego haymikaela diop . So [facility] [phone].    ATENCIÓN: Si habla español, tiene a johnson disposición servicios gratuitos de asistencia lingüística. Llame al [phone].    We comply with applicable federal civil rights laws and [place] laws. We do not discriminate on the basis of race, color, national origin, age, disability, sex, sexual orientation, or gender identity.            Thank you!     Thank you for choosing  [facility]  for your care. Our goal is always to provide you with excellent care. Hearing back from our patients is one way we can continue to improve our services. Please take a few minutes to complete the written survey that you may receive in the mail after your visit with us. Thank you!             Your Updated Medication List - Protect others around you: Learn how to safely use, store and throw away your medicines at www.disposemymeds.org.          This list is accurate as of 10/15/18  4:48 PM.  Always use your most recent med list.                   Brand Name Dispense Instructions for use Diagnosis    BENADRYL ALLERGY PO      Take 1 tablet by mouth daily as needed. Pt took  one tab today hoping it would help his chest pain        cyclobenzaprine 10 MG tablet    FLEXERIL    20 tablet    Take 1 tablet (10 mg) by mouth 3 times daily as needed for muscle spasms        IBUPROFEN PO      Take 400 mg by mouth        KLONOPIN PO      Take 1 mg by mouth as needed        oxyCODONE IR 5 MG tablet    ROXICODONE    20 tablet    Take 1 tablet (5 mg) by mouth every 6 hours as needed for pain        PRILOSEC PO      Take 40 mg by mouth as needed

## 2018-10-15 NOTE — PROGRESS NOTES
Subjective Finding:    Chief compalint: Patient presents with:  Back Pain  , Pain Scale: 6/10, Intensity: sharp, Duration: 2 days, Radiating: down right leg.    Date of injury:     Activities that the pain restricts:   Home/household/hobbies/social activities: yes.  Work duties: yes.  Sleep: yes.  Makes symptoms better: rest.  Makes symptoms worse: activity.  Have you seen anyone else for the symptoms? Yes: MD.  Work related: no.  Automobile related injury: no.    Objective and Assessment:    Posture Analysis:   High shoulder: .  Head tilt: .  High iliac crest: right.  Head carriage: neutral.  Thoracic Kyphosis: forward.  Lumbar Lordosis: forward.    Lumbar Range of Motion: extension decreased, left lateral flexion decreased and right lateral flexion decreased.  Cervical Range of Motion: .  Thoracic Range of Motion: extension decreased.  Extremity Range of Motion: .    Palpation:   Quad lumb: bilateral, referred pain: yes    Segmental dysfunction pre-treatment and treatment area: T11, L5 and PSIS Right.    Assessment post-treatment:  Cervical: .  Thoracic: ROM increased.  Lumbar: ROM increased.    Comments: .      Complicating Factors: .    Procedure(s):  CMT:  84396 Chiropractic manipulative treatment 1-2 regions performed   Thoracic: Diversified, See above for level, Prone and Lumbar: Diversified, See above for level, Side posture    Modalities:  None performed this visit    Therapeutic procedures:  None    Plan:  Treatment plan: 2 times per week for 2 weeks.  Instructed patient: stretch as instructed at visit.  Short term goals: increase ROM.  Long term goals: increase ADL.  Prognosis: very good.

## 2018-12-12 ENCOUNTER — OFFICE VISIT (OUTPATIENT)
Dept: CHIROPRACTIC MEDICINE | Facility: OTHER | Age: 23
End: 2018-12-12
Attending: CHIROPRACTOR
Payer: COMMERCIAL

## 2018-12-12 DIAGNOSIS — M99.03 SEGMENTAL AND SOMATIC DYSFUNCTION OF LUMBAR REGION: Primary | ICD-10-CM

## 2018-12-12 DIAGNOSIS — M99.02 SEGMENTAL AND SOMATIC DYSFUNCTION OF THORACIC REGION: ICD-10-CM

## 2018-12-12 DIAGNOSIS — M54.50 ACUTE BILATERAL LOW BACK PAIN WITHOUT SCIATICA: ICD-10-CM

## 2018-12-12 PROCEDURE — 98940 CHIROPRACT MANJ 1-2 REGIONS: CPT | Mod: AT | Performed by: CHIROPRACTOR

## 2019-01-29 ENCOUNTER — OFFICE VISIT (OUTPATIENT)
Dept: CHIROPRACTIC MEDICINE | Facility: OTHER | Age: 24
End: 2019-01-29
Attending: CHIROPRACTOR
Payer: COMMERCIAL

## 2019-01-29 DIAGNOSIS — M54.50 ACUTE BILATERAL LOW BACK PAIN WITHOUT SCIATICA: ICD-10-CM

## 2019-01-29 DIAGNOSIS — M99.03 SEGMENTAL AND SOMATIC DYSFUNCTION OF LUMBAR REGION: Primary | ICD-10-CM

## 2019-01-29 DIAGNOSIS — M99.02 SEGMENTAL AND SOMATIC DYSFUNCTION OF THORACIC REGION: ICD-10-CM

## 2019-01-29 PROCEDURE — 98940 CHIROPRACT MANJ 1-2 REGIONS: CPT | Mod: AT | Performed by: CHIROPRACTOR

## 2019-01-29 PROCEDURE — 99212 OFFICE O/P EST SF 10 MIN: CPT | Mod: 25 | Performed by: CHIROPRACTOR

## 2019-01-29 NOTE — PROGRESS NOTES
Subjective Finding:    Chief compalint: Patient presents with:  Back Pain  , Pain Scale: 6/10, Intensity: sharp, Duration: 2 days, Radiating: down right leg.    Date of injury:     Activities that the pain restricts:   Home/household/hobbies/social activities: yes.  Work duties: yes.  Sleep: yes.  Makes symptoms better: rest.  Makes symptoms worse: activity.  Have you seen anyone else for the symptoms? Yes: MD.  Work related: no.  Automobile related injury: no.    Objective and Assessment:    Posture Analysis:   High shoulder: .  Head tilt: .  High iliac crest: right.  Head carriage: neutral.  Thoracic Kyphosis: forward.  Lumbar Lordosis: forward.    Lumbar Range of Motion: extension decreased, left lateral flexion decreased and right lateral flexion decreased.  Cervical Range of Motion: .  Thoracic Range of Motion: extension decreased.  Extremity Range of Motion: .    Palpation:   Quad lumb: bilateral, referred pain: yes    Segmental dysfunction pre-treatment and treatment area: T11, L5 and PSIS Right.    Assessment post-treatment:  Cervical: .  Thoracic: ROM increased.  Lumbar: ROM increased.    Comments: .      Complicating Factors: .    Procedure(s):  CMT:  52495 Chiropractic manipulative treatment 1-2 regions performed   Thoracic: Diversified, See above for level, Prone and Lumbar: Diversified, See above for level, Side posture    Modalities:  None performed this visit    Therapeutic procedures:  None    Plan:  Treatment plan: 2 times per week for 2 weeks.  Instructed patient: stretch as instructed at visit.  Short term goals: increase ROM.  Long term goals: increase ADL.  Prognosis: very good.

## 2019-04-08 ENCOUNTER — OFFICE VISIT (OUTPATIENT)
Dept: CHIROPRACTIC MEDICINE | Facility: OTHER | Age: 24
End: 2019-04-08
Attending: NURSE PRACTITIONER
Payer: COMMERCIAL

## 2019-04-08 DIAGNOSIS — M99.03 SEGMENTAL AND SOMATIC DYSFUNCTION OF LUMBAR REGION: Primary | ICD-10-CM

## 2019-04-08 DIAGNOSIS — M99.02 SEGMENTAL AND SOMATIC DYSFUNCTION OF THORACIC REGION: ICD-10-CM

## 2019-04-08 DIAGNOSIS — M54.42 ACUTE LEFT-SIDED LOW BACK PAIN WITH LEFT-SIDED SCIATICA: ICD-10-CM

## 2019-04-08 PROCEDURE — 98940 CHIROPRACT MANJ 1-2 REGIONS: CPT | Mod: AT | Performed by: CHIROPRACTOR

## 2019-04-08 NOTE — PROGRESS NOTES
Subjective Finding:    Chief compalint: Patient presents with:  Back Pain: left sharp pain  , Pain Scale: 6/10, Intensity: sharp, Duration: 2 days, Radiating: down right leg.    Date of injury:     Activities that the pain restricts:   Home/household/hobbies/social activities: yes.  Work duties: yes.  Sleep: yes.  Makes symptoms better: rest.  Makes symptoms worse: activity.  Have you seen anyone else for the symptoms? Yes: MD.  Work related: no.  Automobile related injury: no.    Objective and Assessment:    Posture Analysis:   High shoulder: .  Head tilt: .  High iliac crest: right.  Head carriage: neutral.  Thoracic Kyphosis: forward.  Lumbar Lordosis: forward.    Lumbar Range of Motion: extension decreased, left lateral flexion decreased and right lateral flexion decreased.  Cervical Range of Motion: .  Thoracic Range of Motion: extension decreased.  Extremity Range of Motion: .    Palpation:   Quad lumb: bilateral, referred pain: yes    Segmental dysfunction pre-treatment and treatment area: T11, L5 and PSIS Right.    Assessment post-treatment:  Cervical: .  Thoracic: ROM increased.  Lumbar: ROM increased.    Comments: .      Complicating Factors: .    Procedure(s):  [facility]:  40249 Chiropractic manipulative treatment 1-2 regions performed   Thoracic: Diversified, See above for level, Prone and Lumbar: Diversified, See above for level, Side posture    Modalities:  None performed this visit    Therapeutic procedures:  None    Plan:  Treatment plan: 2 times per week for 2 weeks.  Instructed patient: stretch as instructed at visit.  Short term goals: increase ROM.  Long term goals: increase ADL.  Prognosis: very good.

## 2019-06-21 ENCOUNTER — OFFICE VISIT (OUTPATIENT)
Dept: CHIROPRACTIC MEDICINE | Facility: OTHER | Age: 24
End: 2019-06-21
Attending: CHIROPRACTOR
Payer: COMMERCIAL

## 2019-06-21 DIAGNOSIS — M54.50 ACUTE BILATERAL LOW BACK PAIN WITHOUT SCIATICA: ICD-10-CM

## 2019-06-21 DIAGNOSIS — M99.02 SEGMENTAL AND SOMATIC DYSFUNCTION OF THORACIC REGION: ICD-10-CM

## 2019-06-21 DIAGNOSIS — M99.03 SEGMENTAL AND SOMATIC DYSFUNCTION OF LUMBAR REGION: Primary | ICD-10-CM

## 2019-06-21 PROCEDURE — 98940 CHIROPRACT MANJ 1-2 REGIONS: CPT | Mod: AT | Performed by: CHIROPRACTOR

## 2019-06-24 NOTE — PROGRESS NOTES
Subjective Finding:    Chief compalint: Patient presents with:  Back Pain  , Pain Scale: 6/10, Intensity: sharp, Duration: 2 days, Radiating: down right leg.    Date of injury:     Activities that the pain restricts:   Home/household/hobbies/social activities: yes.  Work duties: yes.  Sleep: yes.  Makes symptoms better: rest.  Makes symptoms worse: activity.  Have you seen anyone else for the symptoms? Yes: MD.  Work related: no.  Automobile related injury: no.    Objective and Assessment:    Posture Analysis:   High shoulder: .  Head tilt: .  High iliac crest: right.  Head carriage: neutral.  Thoracic Kyphosis: forward.  Lumbar Lordosis: forward.    Lumbar Range of Motion: extension decreased, left lateral flexion decreased and right lateral flexion decreased.  Cervical Range of Motion: .  Thoracic Range of Motion: extension decreased.  Extremity Range of Motion: .    Palpation:   Quad lumb: bilateral, referred pain: yes    Segmental dysfunction pre-treatment and treatment area: T11, L5 and PSIS Right.    Assessment post-treatment:  Cervical: .  Thoracic: ROM increased.  Lumbar: ROM increased.    Comments: .      Complicating Factors: .    Procedure(s):  CMT:  17944 Chiropractic manipulative treatment 1-2 regions performed   Thoracic: Diversified, See above for level, Prone and Lumbar: Diversified, See above for level, Side posture    Modalities:  None performed this visit    Therapeutic procedures:  None    Plan:  Treatment plan: 2 times per week for 2 weeks.  Instructed patient: stretch as instructed at visit.  Short term goals: increase ROM.  Long term goals: increase ADL.  Prognosis: very good.

## 2019-10-03 ENCOUNTER — OFFICE VISIT (OUTPATIENT)
Dept: CHIROPRACTIC MEDICINE | Facility: OTHER | Age: 24
End: 2019-10-03
Attending: CHIROPRACTOR
Payer: COMMERCIAL

## 2019-10-03 DIAGNOSIS — M99.02 SEGMENTAL AND SOMATIC DYSFUNCTION OF THORACIC REGION: ICD-10-CM

## 2019-10-03 DIAGNOSIS — M99.03 SEGMENTAL AND SOMATIC DYSFUNCTION OF LUMBAR REGION: Primary | ICD-10-CM

## 2019-10-03 DIAGNOSIS — M54.50 ACUTE BILATERAL LOW BACK PAIN WITHOUT SCIATICA: ICD-10-CM

## 2019-10-03 PROCEDURE — 98941 CHIROPRACT MANJ 3-4 REGIONS: CPT | Mod: AT | Performed by: CHIROPRACTOR

## 2019-10-03 NOTE — PROGRESS NOTES
Subjective Finding:    Chief compalint: Patient presents with:  Back Pain  , Pain Scale: 6/10, Intensity: sharp, Duration: 2 days, Radiating: down right leg.    Date of injury:     Activities that the pain restricts:   Home/household/hobbies/social activities: yes.  Work duties: yes.  Sleep: yes.  Makes symptoms better: rest.  Makes symptoms worse: activity.  Have you seen anyone else for the symptoms? Yes: MD.  Work related: no.  Automobile related injury: no.    Objective and Assessment:    Posture Analysis:   High shoulder: .  Head tilt: .  High iliac crest: right.  Head carriage: neutral.  Thoracic Kyphosis: forward.  Lumbar Lordosis: forward.    Lumbar Range of Motion: extension decreased, left lateral flexion decreased and right lateral flexion decreased.  Cervical Range of Motion: .  Thoracic Range of Motion: extension decreased.  Extremity Range of Motion: .    Palpation:   Quad lumb: bilateral, referred pain: yes    Segmental dysfunction pre-treatment and treatment area: T11, L5 and PSIS Right.    Assessment post-treatment:  Cervical: .  Thoracic: ROM increased.  Lumbar: ROM increased.    Comments: .      Complicating Factors: .    Procedure(s):  CMT:  59768 Chiropractic manipulative treatment 1-2 regions performed   Thoracic: Diversified, See above for level, Prone and Lumbar: Diversified, See above for level, Side posture    Modalities:  None performed this visit    Therapeutic procedures:  None    Plan:  Treatment plan: 2 times per week for 2 weeks.  Instructed patient: stretch as instructed at visit.  Short term goals: increase ROM.  Long term goals: increase ADL.  Prognosis: very good.

## 2020-01-09 ENCOUNTER — OFFICE VISIT (OUTPATIENT)
Dept: CHIROPRACTIC MEDICINE | Facility: OTHER | Age: 25
End: 2020-01-09
Attending: CHIROPRACTOR
Payer: COMMERCIAL

## 2020-01-09 DIAGNOSIS — M99.03 SEGMENTAL AND SOMATIC DYSFUNCTION OF LUMBAR REGION: Primary | ICD-10-CM

## 2020-01-09 DIAGNOSIS — M54.50 ACUTE BILATERAL LOW BACK PAIN WITHOUT SCIATICA: ICD-10-CM

## 2020-01-09 DIAGNOSIS — M99.02 SEGMENTAL AND SOMATIC DYSFUNCTION OF THORACIC REGION: ICD-10-CM

## 2020-01-09 PROCEDURE — 98941 CHIROPRACT MANJ 3-4 REGIONS: CPT | Mod: AT | Performed by: CHIROPRACTOR

## 2020-01-13 NOTE — PROGRESS NOTES
Subjective Finding:    Chief compalint: Patient presents with:  Back Pain  , Pain Scale: 6/10, Intensity: sharp, Duration: 2 days, Radiating: down right leg.    Date of injury:     Activities that the pain restricts:   Home/household/hobbies/social activities: yes.  Work duties: yes.  Sleep: yes.  Makes symptoms better: rest.  Makes symptoms worse: activity.  Have you seen anyone else for the symptoms? Yes: MD.  Work related: no.  Automobile related injury: no.    Objective and Assessment:    Posture Analysis:   High shoulder: .  Head tilt: .  High iliac crest: right.  Head carriage: neutral.  Thoracic Kyphosis: forward.  Lumbar Lordosis: forward.    Lumbar Range of Motion: extension decreased, left lateral flexion decreased and right lateral flexion decreased.  Cervical Range of Motion: .  Thoracic Range of Motion: extension decreased.  Extremity Range of Motion: .    Palpation:   Quad lumb: bilateral, referred pain: yes    Segmental dysfunction pre-treatment and treatment area: T11, L5 and PSIS Right.    Assessment post-treatment:  Cervical: .  Thoracic: ROM increased.  Lumbar: ROM increased.    Comments: .      Complicating Factors: .    Procedure(s):  CMT:  90301 Chiropractic manipulative treatment 1-2 regions performed   Thoracic: Diversified, See above for level, Prone and Lumbar: Diversified, See above for level, Side posture    Modalities:  None performed this visit    Therapeutic procedures:  None    Plan:  Treatment plan: 2 times per week for 2 weeks.  Instructed patient: stretch as instructed at visit.  Short term goals: increase ROM.  Long term goals: increase ADL.  Prognosis: very good.

## 2020-01-25 ENCOUNTER — HOSPITAL ENCOUNTER (EMERGENCY)
Facility: HOSPITAL | Age: 25
Discharge: HOME | End: 2020-01-25
Attending: NURSE PRACTITIONER | Admitting: NURSE PRACTITIONER
Payer: COMMERCIAL

## 2020-01-25 VITALS
SYSTOLIC BLOOD PRESSURE: 160 MMHG | TEMPERATURE: 99.8 F | WEIGHT: 315 LBS | OXYGEN SATURATION: 97 % | DIASTOLIC BLOOD PRESSURE: 92 MMHG | RESPIRATION RATE: 16 BRPM

## 2020-01-25 DIAGNOSIS — K08.89 PAIN, DENTAL: ICD-10-CM

## 2020-01-25 PROCEDURE — G0463 HOSPITAL OUTPT CLINIC VISIT: HCPCS

## 2020-01-25 PROCEDURE — 99203 OFFICE O/P NEW LOW 30 MIN: CPT | Mod: Z6 | Performed by: NURSE PRACTITIONER

## 2020-01-25 RX ORDER — HYDROXYZINE HYDROCHLORIDE 10 MG/1
10 TABLET, FILM COATED ORAL 3 TIMES DAILY PRN
COMMUNITY
End: 2022-08-30

## 2020-01-25 RX ORDER — LISINOPRIL 10 MG/1
10 TABLET ORAL DAILY
COMMUNITY

## 2020-01-25 ASSESSMENT — ENCOUNTER SYMPTOMS
VOMITING: 0
HEADACHES: 0
CHILLS: 1
NAUSEA: 0
ACTIVITY CHANGE: 1
NECK PAIN: 0
FEVER: 0

## 2020-01-25 NOTE — ED AVS SNAPSHOT
[facility]  [address] [address]  Phone:  [phone]                                    Edgar Garrett   MRN: [id number]    Department:  [facility]   Date of Visit:  1/25/2020           After Visit Summary Signature Page    I have received my discharge instructions, and my questions have been answered. I have discussed any challenges I see with this plan with the nurse or doctor.    ..........................................................................................................................................  Patient/Patient Representative Signature      ..........................................................................................................................................  Patient Representative Print Name and Relationship to Patient    ..................................................               ................................................  Date                                   Time    ..........................................................................................................................................  Reviewed by Signature/Title    ...................................................              ..............................................  Date                                               Time          22EPIC Rev 08/18

## 2020-01-26 NOTE — ED TRIAGE NOTES
Pt c/o redness and swelling he noticed yesterday that has increased today. States area right maxillary area.

## 2020-01-26 NOTE — ED TRIAGE NOTES
C/o L sided dental pain since last night. Does have a dentist that he sees, does not have appt. Set up

## 2020-01-26 NOTE — ED PROVIDER NOTES
History     Chief Complaint   Patient presents with     Oral Swelling     HPI  Edgar Garrett is a 24 year old male who presents with dental pain that started last evening on the posterior aspect of his tooth and has extended to the anterior aspect today. He has never had an abscessed tooth before and has yearly dental cleanings. He has not taken on OTC medication today for dental pain. Denies neck pain or stiffness, fevers, chills, nausea, and vomiting.       Accompanied per: wife  Length of illness: started yesterday  History of prior: never had abscessed tooth before  Last episode: goes once yearly for cleaning  Medications or interventions for discomfort: ibuprofen  Last dose of OTC: last evening took ibuprofen for headache.    Allergies:  Allergies   Allergen Reactions     Nka [No Known Allergies]        Problem List:    There are no active problems to display for this patient.       Past Medical History:    No past medical history on file.    Past Surgical History:    No past surgical history on file.    Family History:    No family history on file.    Social History:  Marital Status:  Single [1]  Social History     Tobacco Use     Smoking status: Never Smoker   Substance Use Topics     Alcohol use: Not on file     Drug use: No        Medications:    hydrOXYzine (ATARAX) 10 MG tablet  lisinopril (PRINIVIL/ZESTRIL) 10 MG tablet  ClonazePAM (KLONOPIN PO)  DiphenhydrAMINE HCl (BENADRYL ALLERGY PO)  IBUPROFEN PO  Omeprazole (PRILOSEC PO)  oxyCODONE IR (ROXICODONE) 5 MG tablet          Review of Systems   Constitutional: Positive for activity change and chills. Negative for fever.   HENT: Positive for dental problem.    Gastrointestinal: Negative for nausea and vomiting.   Musculoskeletal: Negative for neck pain.   Neurological: Negative for headaches.       Physical Exam   BP: 160/92  Heart Rate: 75  Temp: 99.8  F (37.7  C)  Resp: 16  Weight: 149.7 kg (330 lb)  SpO2: 97 %      Physical Exam  Vitals signs and  nursing note reviewed.   Constitutional:       General: He is not in acute distress.     Appearance: He is obese.   HENT:      Mouth/Throat:      Mouth: Mucous membranes are moist.      Dentition: Normal dentition. No dental caries or dental abscesses.      Pharynx: Oropharynx is clear.     Cardiovascular:      Rate and Rhythm: Normal rate.   Pulmonary:      Effort: Pulmonary effort is normal.   Skin:     General: Skin is warm and dry.   Neurological:      Mental Status: He is alert and oriented to person, place, and time.   Psychiatric:         Behavior: Behavior normal.         ED Course        Procedures                 No results found for this or any previous visit (from the past 24 hour(s)).    Medications - No data to display    Assessments & Plan (with Medical Decision Making)     I have reviewed the nursing notes.    I have reviewed the findings, diagnosis, plan and need for follow up with the patient.  (K08.89) Pain, dental    Comment: dental pain that started posteriorly behind teeth numbers 10 and 11. The pain has extended at this time to the anterior portions of these teeth. There is minimal erythema and swelling noted. There is a 2 mm ecchymotic area noted behind tooth #10 and he does state he ate sour dough bread a couple of days ago and this possible could have caused the bruising. No abscess is noted and he does not have dental caries noted. His teeth are white with some plaque noted    Plan: Ice to affected area 20 minutes every hour as needed for comfort. After 48 hours you can apply heat. Ibuprofen 600 to 800 mg (3 - 4 tabs of over the counter med) every six to eight hours as needed;not to exceed maximum amount of 3200 mg in 24 hours.Tylenol 650 to 1000 mg every four to six hours as needed (not to exceed more than 4000 mg in a 24 hour period). May use interchangeably. Suggest medicating around the clock for the next 24-48 hours.   Do salt water gargles after each meal (three to four times  daily)  Do good oral hygiene  Follow-up with dentist if still having pain on Monday.    These discharge instructions and medications were reviewed with him and his wife and understanding verbalized.            Discharge Medication List as of 1/25/2020  7:36 PM          Final diagnoses:   Pain, dental       1/25/2020   [facility]      Fannie Florian, CNP  01/25/20 1949

## 2020-01-26 NOTE — DISCHARGE INSTRUCTIONS
Ice to affected area 20 minutes every hour as needed for comfort. After 48 hours you can apply heat. Ibuprofen 600 to 800 mg (3 - 4 tabs of over the counter med) every six to eight hours as needed;not to exceed maximum amount of 3200 mg in 24 hours.Tylenol 650 to 1000 mg every four to six hours as needed (not to exceed more than 4000 mg in a 24 hour period). May use interchangeably. Suggest medicating around the clock for the next 24-48 hours.   Do salt water gargles after each meal (three to four times daily)  Do good oral hygiene  Follow-up with dentist if still having pain on Monday.

## 2020-06-25 ENCOUNTER — OFFICE VISIT (OUTPATIENT)
Dept: CHIROPRACTIC MEDICINE | Facility: OTHER | Age: 25
End: 2020-06-25
Attending: CHIROPRACTOR
Payer: COMMERCIAL

## 2020-06-25 DIAGNOSIS — M99.01 SEGMENTAL AND SOMATIC DYSFUNCTION OF CERVICAL REGION: ICD-10-CM

## 2020-06-25 DIAGNOSIS — M54.50 ACUTE BILATERAL LOW BACK PAIN WITHOUT SCIATICA: ICD-10-CM

## 2020-06-25 DIAGNOSIS — M99.02 SEGMENTAL AND SOMATIC DYSFUNCTION OF THORACIC REGION: ICD-10-CM

## 2020-06-25 DIAGNOSIS — M99.03 SEGMENTAL AND SOMATIC DYSFUNCTION OF LUMBAR REGION: Primary | ICD-10-CM

## 2020-06-25 PROCEDURE — 98941 CHIROPRACT MANJ 3-4 REGIONS: CPT | Mod: AT | Performed by: CHIROPRACTOR

## 2020-06-25 NOTE — PROGRESS NOTES
Subjective Finding:    Chief compalint: Patient presents with:  Back Pain  Neck Pain  , Pain Scale: 6/10, Intensity: sharp, Duration: 2 days, Radiating: down right leg.    Date of injury:     Activities that the pain restricts:   Home/household/hobbies/social activities: yes.  Work duties: yes.  Sleep: yes.  Makes symptoms better: rest.  Makes symptoms worse: activity.  Have you seen anyone else for the symptoms? Yes: MD.  Work related: no.  Automobile related injury: no.    Objective and Assessment:    Posture Analysis:   High shoulder: .  Head tilt: .  High iliac crest: right.  Head carriage: neutral.  Thoracic Kyphosis: forward.  Lumbar Lordosis: forward.    Lumbar Range of Motion: extension decreased, left lateral flexion decreased and right lateral flexion decreased.  Cervical Range of Motion: .  Thoracic Range of Motion: extension decreased.  Extremity Range of Motion: .    Palpation:   Quad lumb: bilateral, referred pain: yes    Segmental dysfunction pre-treatment and treatment area: T11, L5 and PSIS Right.    Assessment post-treatment:  Cervical: .  Thoracic: ROM increased.  Lumbar: ROM increased.    Comments: .      Complicating Factors: .    Procedure(s):  [facility]:  99317 Chiropractic manipulative treatment 1-2 regions performed   Thoracic: Diversified, See above for level, Prone and Lumbar: Diversified, See above for level, Side posture    Modalities:  None performed this visit    Therapeutic procedures:  None    Plan:  Treatment plan: 2 times per week for 2 weeks.  Instructed patient: stretch as instructed at visit.  Short term goals: increase ROM.  Long term goals: increase ADL.  Prognosis: very good.

## 2020-09-24 ENCOUNTER — OFFICE VISIT (OUTPATIENT)
Dept: CHIROPRACTIC MEDICINE | Facility: OTHER | Age: 25
End: 2020-09-24
Attending: CHIROPRACTOR
Payer: COMMERCIAL

## 2020-09-24 DIAGNOSIS — M54.50 ACUTE BILATERAL LOW BACK PAIN WITHOUT SCIATICA: ICD-10-CM

## 2020-09-24 DIAGNOSIS — M99.02 SEGMENTAL AND SOMATIC DYSFUNCTION OF THORACIC REGION: ICD-10-CM

## 2020-09-24 DIAGNOSIS — M99.03 SEGMENTAL AND SOMATIC DYSFUNCTION OF LUMBAR REGION: Primary | ICD-10-CM

## 2020-09-24 PROCEDURE — 98940 CHIROPRACT MANJ 1-2 REGIONS: CPT | Mod: AT | Performed by: CHIROPRACTOR

## 2020-09-28 NOTE — PROGRESS NOTES
Subjective Finding:    Chief compalint: Patient presents with:  Back Pain  , Pain Scale: 6/10, Intensity: sharp, Duration: 2 days, Radiating: down right leg.    Date of injury:     Activities that the pain restricts:   Home/household/hobbies/social activities: yes.  Work duties: yes.  Sleep: yes.  Makes symptoms better: rest.  Makes symptoms worse: activity.  Have you seen anyone else for the symptoms? Yes: MD.  Work related: no.  Automobile related injury: no.    Objective and Assessment:    Posture Analysis:   High shoulder: .  Head tilt: .  High iliac crest: right.  Head carriage: neutral.  Thoracic Kyphosis: forward.  Lumbar Lordosis: forward.    Lumbar Range of Motion: extension decreased, left lateral flexion decreased and right lateral flexion decreased.  Cervical Range of Motion: .  Thoracic Range of Motion: extension decreased.  Extremity Range of Motion: .    Palpation:   Quad lumb: bilateral, referred pain: yes    Segmental dysfunction pre-treatment and treatment area: T11, L5 and PSIS Right.    Assessment post-treatment:  Cervical: .  Thoracic: ROM increased.  Lumbar: ROM increased.    Comments: .      Complicating Factors: .    Procedure(s):  CMT:  99534 Chiropractic manipulative treatment 1-2 regions performed   Thoracic: Diversified, See above for level, Prone and Lumbar: Diversified, See above for level, Side posture    Modalities:  None performed this visit    Therapeutic procedures:  None    Plan:  Treatment plan: 2 times per week for 2 weeks.  Instructed patient: stretch as instructed at visit.  Short term goals: increase ROM.  Long term goals: increase ADL.  Prognosis: very good.

## 2021-06-16 ENCOUNTER — OFFICE VISIT (OUTPATIENT)
Dept: CHIROPRACTIC MEDICINE | Facility: OTHER | Age: 26
End: 2021-06-16
Attending: CHIROPRACTOR
Payer: COMMERCIAL

## 2021-06-16 DIAGNOSIS — M99.02 SEGMENTAL AND SOMATIC DYSFUNCTION OF THORACIC REGION: ICD-10-CM

## 2021-06-16 DIAGNOSIS — M99.01 SEGMENTAL AND SOMATIC DYSFUNCTION OF CERVICAL REGION: ICD-10-CM

## 2021-06-16 DIAGNOSIS — M54.50 ACUTE BILATERAL LOW BACK PAIN WITHOUT SCIATICA: ICD-10-CM

## 2021-06-16 DIAGNOSIS — M99.03 SEGMENTAL AND SOMATIC DYSFUNCTION OF LUMBAR REGION: Primary | ICD-10-CM

## 2021-06-16 PROCEDURE — 99212 OFFICE O/P EST SF 10 MIN: CPT | Mod: 25 | Performed by: CHIROPRACTOR

## 2021-06-16 PROCEDURE — 98941 CHIROPRACT MANJ 3-4 REGIONS: CPT | Mod: AT | Performed by: CHIROPRACTOR

## 2021-06-21 NOTE — PROGRESS NOTES
Subjective Finding:    Chief compalint: Patient presents with:  Back Pain  Neck Pain  , Pain Scale: 6/10, Intensity: sharp, Duration: 2 days, Radiating: down right leg.    Date of injury:     Activities that the pain restricts:   Home/household/hobbies/social activities: yes.  Work duties: yes.  Sleep: yes.  Makes symptoms better: rest.  Makes symptoms worse: activity.  Have you seen anyone else for the symptoms? Yes: MD.  Work related: no.  Automobile related injury: no.    Objective and Assessment:    Posture Analysis:   High shoulder: .  Head tilt: .  High iliac crest: right.  Head carriage: neutral.  Thoracic Kyphosis: forward.  Lumbar Lordosis: forward.    Lumbar Range of Motion: extension decreased, left lateral flexion decreased and right lateral flexion decreased.  Cervical Range of Motion: .  Thoracic Range of Motion: extension decreased.  Extremity Range of Motion: .    Palpation:   Quad lumb: bilateral, referred pain: yes    Segmental dysfunction pre-treatment and treatment area: T11, L5 and PSIS Right. C6  Assessment post-treatment:  Cervical: .  Thoracic: ROM increased.  Lumbar: ROM increased.    Comments: .      Complicating Factors: .    Procedure(s):  [facility]:  92521 Chiropractic manipulative treatment 1-2 regions performed   Thoracic: Diversified, See above for level, Prone and Lumbar: Diversified, See above for level, Side posture    Modalities:  None performed this visit    Therapeutic procedures:  None    Plan:  Treatment plan: 2 times per week for 2 weeks.  Instructed patient: stretch as instructed at visit.  Short term goals: increase ROM.  Long term goals: increase ADL.  Prognosis: very good.

## 2021-10-18 ENCOUNTER — OFFICE VISIT (OUTPATIENT)
Dept: CHIROPRACTIC MEDICINE | Facility: OTHER | Age: 26
End: 2021-10-18
Attending: CHIROPRACTOR
Payer: COMMERCIAL

## 2021-10-18 DIAGNOSIS — M99.02 SEGMENTAL AND SOMATIC DYSFUNCTION OF THORACIC REGION: ICD-10-CM

## 2021-10-18 DIAGNOSIS — M99.03 SEGMENTAL AND SOMATIC DYSFUNCTION OF LUMBAR REGION: Primary | ICD-10-CM

## 2021-10-18 DIAGNOSIS — M54.50 ACUTE BILATERAL LOW BACK PAIN WITHOUT SCIATICA: ICD-10-CM

## 2021-10-18 DIAGNOSIS — M99.01 SEGMENTAL AND SOMATIC DYSFUNCTION OF CERVICAL REGION: ICD-10-CM

## 2021-10-18 PROCEDURE — 98941 CHIROPRACT MANJ 3-4 REGIONS: CPT | Mod: AT | Performed by: CHIROPRACTOR

## 2021-10-18 NOTE — PROGRESS NOTES
Subjective Finding:    Chief compalint: Patient presents with:  Back Pain  Neck Pain  , Pain Scale: 6/10, Intensity: sharp, Duration: 2 days, Radiating: down right leg.    Date of injury:     Activities that the pain restricts:   Home/household/hobbies/social activities: yes.  Work duties: yes.  Sleep: yes.  Makes symptoms better: rest.  Makes symptoms worse: activity.  Have you seen anyone else for the symptoms? Yes: MD.  Work related: no.  Automobile related injury: no.    Objective and Assessment:    Posture Analysis:   High shoulder: .  Head tilt: .  High iliac crest: right.  Head carriage: neutral.  Thoracic Kyphosis: forward.  Lumbar Lordosis: forward.    Lumbar Range of Motion: extension decreased, left lateral flexion decreased and right lateral flexion decreased.  Cervical Range of Motion: .  Thoracic Range of Motion: extension decreased.  Extremity Range of Motion: .    Palpation:   Quad lumb: bilateral, referred pain: yes    Segmental dysfunction pre-treatment and treatment area: T11, L5 and PSIS Right. C6  Assessment post-treatment:  Cervical: .  Thoracic: ROM increased.  Lumbar: ROM increased.    Comments: .      Complicating Factors: .    Procedure(s):  [facility]:  26600 Chiropractic manipulative treatment 1-2 regions performed   Thoracic: Diversified, See above for level, Prone and Lumbar: Diversified, See above for level, Side posture    Modalities:  None performed this visit    Therapeutic procedures:  None    Plan:  Treatment plan: 2 times per week for 2 weeks.  Instructed patient: stretch as instructed at visit.  Short term goals: increase ROM.  Long term goals: increase ADL.  Prognosis: very good.

## 2021-10-25 ENCOUNTER — OFFICE VISIT (OUTPATIENT)
Dept: CHIROPRACTIC MEDICINE | Facility: OTHER | Age: 26
End: 2021-10-25
Attending: CHIROPRACTOR
Payer: COMMERCIAL

## 2021-10-25 DIAGNOSIS — M99.03 SEGMENTAL AND SOMATIC DYSFUNCTION OF LUMBAR REGION: Primary | ICD-10-CM

## 2021-10-25 DIAGNOSIS — M99.01 SEGMENTAL AND SOMATIC DYSFUNCTION OF CERVICAL REGION: ICD-10-CM

## 2021-10-25 DIAGNOSIS — M54.50 ACUTE BILATERAL LOW BACK PAIN WITHOUT SCIATICA: ICD-10-CM

## 2021-10-25 DIAGNOSIS — M99.02 SEGMENTAL AND SOMATIC DYSFUNCTION OF THORACIC REGION: ICD-10-CM

## 2021-10-25 PROCEDURE — 98941 CHIROPRACT MANJ 3-4 REGIONS: CPT | Mod: AT | Performed by: CHIROPRACTOR

## 2021-10-27 NOTE — PROGRESS NOTES
Subjective Finding:    Chief compalint: Patient presents with:  Neck Pain  Back Pain  , Pain Scale: 6/10, Intensity: sharp, Duration: 2 days, Radiating: down right leg.    Date of injury:     Activities that the pain restricts:   Home/household/hobbies/social activities: yes.  Work duties: yes.  Sleep: yes.  Makes symptoms better: rest.  Makes symptoms worse: activity.  Have you seen anyone else for the symptoms? Yes: MD.  Work related: no.  Automobile related injury: no.    Objective and Assessment:    Posture Analysis:   High shoulder: .  Head tilt: .  High iliac crest: right.  Head carriage: neutral.  Thoracic Kyphosis: forward.  Lumbar Lordosis: forward.    Lumbar Range of Motion: extension decreased, left lateral flexion decreased and right lateral flexion decreased.  Cervical Range of Motion: .  Thoracic Range of Motion: extension decreased.  Extremity Range of Motion: .    Palpation:   Quad lumb: bilateral, referred pain: yes    Segmental dysfunction pre-treatment and treatment area: T11, L5 and PSIS Right. C6  Assessment post-treatment:  Cervical: .  Thoracic: ROM increased.  Lumbar: ROM increased.    Comments: .      Complicating Factors: .    Procedure(s):  [facility]:  65541 Chiropractic manipulative treatment 1-2 regions performed   Thoracic: Diversified, See above for level, Prone and Lumbar: Diversified, See above for level, Side posture    Modalities:  None performed this visit    Therapeutic procedures:  None    Plan:  Treatment plan: 2 times per week for 2 weeks.  Instructed patient: stretch as instructed at visit.  Short term goals: increase ROM.  Long term goals: increase ADL.  Prognosis: very good.

## 2021-11-01 ENCOUNTER — OFFICE VISIT (OUTPATIENT)
Dept: CHIROPRACTIC MEDICINE | Facility: OTHER | Age: 26
End: 2021-11-01
Attending: CHIROPRACTOR
Payer: COMMERCIAL

## 2021-11-01 DIAGNOSIS — M54.50 ACUTE BILATERAL LOW BACK PAIN WITHOUT SCIATICA: ICD-10-CM

## 2021-11-01 DIAGNOSIS — M99.02 SEGMENTAL AND SOMATIC DYSFUNCTION OF THORACIC REGION: ICD-10-CM

## 2021-11-01 DIAGNOSIS — M99.03 SEGMENTAL AND SOMATIC DYSFUNCTION OF LUMBAR REGION: Primary | ICD-10-CM

## 2021-11-01 DIAGNOSIS — M99.01 SEGMENTAL AND SOMATIC DYSFUNCTION OF CERVICAL REGION: ICD-10-CM

## 2021-11-01 PROCEDURE — 98941 CHIROPRACT MANJ 3-4 REGIONS: CPT | Mod: AT | Performed by: CHIROPRACTOR

## 2021-11-02 NOTE — PROGRESS NOTES
Subjective Finding:    Chief compalint: Patient presents with:  Back Pain  Neck Pain  , Pain Scale: 6/10, Intensity: sharp, Duration: 2 days, Radiating: down right leg.    Date of injury:     Activities that the pain restricts:   Home/household/hobbies/social activities: yes.  Work duties: yes.  Sleep: yes.  Makes symptoms better: rest.  Makes symptoms worse: activity.  Have you seen anyone else for the symptoms? Yes: MD.  Work related: no.  Automobile related injury: no.    Objective and Assessment:    Posture Analysis:   High shoulder: .  Head tilt: .  High iliac crest: right.  Head carriage: neutral.  Thoracic Kyphosis: forward.  Lumbar Lordosis: forward.    Lumbar Range of Motion: extension decreased, left lateral flexion decreased and right lateral flexion decreased.  Cervical Range of Motion: .  Thoracic Range of Motion: extension decreased.  Extremity Range of Motion: .    Palpation:   Quad lumb: bilateral, referred pain: yes    Segmental dysfunction pre-treatment and treatment area: T11, L5 and PSIS Right. C6  Assessment post-treatment:  Cervical: .  Thoracic: ROM increased.  Lumbar: ROM increased.    Comments: .      Complicating Factors: .    Procedure(s):  [facility]:  53848 Chiropractic manipulative treatment 1-2 regions performed   Thoracic: Diversified, See above for level, Prone and Lumbar: Diversified, See above for level, Side posture    Modalities:  None performed this visit    Therapeutic procedures:  None    Plan:  Treatment plan: 2 times per week for 2 weeks.  Instructed patient: stretch as instructed at visit.  Short term goals: increase ROM.  Long term goals: increase ADL.  Prognosis: very good.

## 2021-11-08 ENCOUNTER — OFFICE VISIT (OUTPATIENT)
Dept: CHIROPRACTIC MEDICINE | Facility: OTHER | Age: 26
End: 2021-11-08
Attending: CHIROPRACTOR
Payer: COMMERCIAL

## 2021-11-08 DIAGNOSIS — M99.01 SEGMENTAL AND SOMATIC DYSFUNCTION OF CERVICAL REGION: ICD-10-CM

## 2021-11-08 DIAGNOSIS — M54.50 ACUTE BILATERAL LOW BACK PAIN WITHOUT SCIATICA: ICD-10-CM

## 2021-11-08 DIAGNOSIS — M99.03 SEGMENTAL AND SOMATIC DYSFUNCTION OF LUMBAR REGION: Primary | ICD-10-CM

## 2021-11-08 DIAGNOSIS — M99.02 SEGMENTAL AND SOMATIC DYSFUNCTION OF THORACIC REGION: ICD-10-CM

## 2021-11-08 PROCEDURE — 98941 CHIROPRACT MANJ 3-4 REGIONS: CPT | Mod: AT | Performed by: CHIROPRACTOR

## 2021-11-08 NOTE — PROGRESS NOTES
Subjective Finding:    Chief compalint: Patient presents with:  Back Pain  , Pain Scale: 6/10, Intensity: sharp, Duration: 2 days, Radiating: down right leg.    Date of injury:     Activities that the pain restricts:   Home/household/hobbies/social activities: yes.  Work duties: yes.  Sleep: yes.  Makes symptoms better: rest.  Makes symptoms worse: activity.  Have you seen anyone else for the symptoms? Yes: MD.  Work related: no.  Automobile related injury: no.    Objective and Assessment:    Posture Analysis:   High shoulder: .  Head tilt: .  High iliac crest: right.  Head carriage: neutral.  Thoracic Kyphosis: forward.  Lumbar Lordosis: forward.    Lumbar Range of Motion: extension decreased, left lateral flexion decreased and right lateral flexion decreased.  Cervical Range of Motion: .  Thoracic Range of Motion: extension decreased.  Extremity Range of Motion: .    Palpation:   Quad lumb: bilateral, referred pain: yes    Segmental dysfunction pre-treatment and treatment area: T11, L5 and PSIS Right. C6  Assessment post-treatment:  Cervical: .  Thoracic: ROM increased.  Lumbar: ROM increased.    Comments: .      Complicating Factors: .    Procedure(s):  [facility]:  72836 Chiropractic manipulative treatment 1-2 regions performed   Thoracic: Diversified, See above for level, Prone and Lumbar: Diversified, See above for level, Side posture    Modalities:  None performed this visit    Therapeutic procedures:  None    Plan:  Treatment plan: 2 times per week for 2 weeks.  Instructed patient: stretch as instructed at visit.  Short term goals: increase ROM.  Long term goals: increase ADL.  Prognosis: very good.

## 2021-11-22 ENCOUNTER — OFFICE VISIT (OUTPATIENT)
Dept: CHIROPRACTIC MEDICINE | Facility: OTHER | Age: 26
End: 2021-11-22
Attending: CHIROPRACTOR
Payer: COMMERCIAL

## 2021-11-22 DIAGNOSIS — M99.03 SEGMENTAL AND SOMATIC DYSFUNCTION OF LUMBAR REGION: Primary | ICD-10-CM

## 2021-11-22 DIAGNOSIS — M54.50 ACUTE BILATERAL LOW BACK PAIN WITHOUT SCIATICA: ICD-10-CM

## 2021-11-22 DIAGNOSIS — M99.02 SEGMENTAL AND SOMATIC DYSFUNCTION OF THORACIC REGION: ICD-10-CM

## 2021-11-22 PROCEDURE — 98940 CHIROPRACT MANJ 1-2 REGIONS: CPT | Mod: AT | Performed by: CHIROPRACTOR

## 2021-11-30 NOTE — PROGRESS NOTES
Subjective Finding:    Chief compalint: Patient presents with:  Back Pain  , Pain Scale: 6/10, Intensity: sharp, Duration: 2 days, Radiating: down right leg.    Date of injury:     Activities that the pain restricts:   Home/household/hobbies/social activities: yes.  Work duties: yes.  Sleep: yes.  Makes symptoms better: rest.  Makes symptoms worse: activity.  Have you seen anyone else for the symptoms? Yes: MD.  Work related: no.  Automobile related injury: no.    Objective and Assessment:    Posture Analysis:   High shoulder: .  Head tilt: .  High iliac crest: right.  Head carriage: neutral.  Thoracic Kyphosis: forward.  Lumbar Lordosis: forward.    Lumbar Range of Motion: extension decreased, left lateral flexion decreased and right lateral flexion decreased.  Cervical Range of Motion: .  Thoracic Range of Motion: extension decreased.  Extremity Range of Motion: .    Palpation:   Quad lumb: bilateral, referred pain: yes    Segmental dysfunction pre-treatment and treatment area: T11, L5 and PSIS Right. C6  Assessment post-treatment:  Cervical: .  Thoracic: ROM increased.  Lumbar: ROM increased.    Comments: .      Complicating Factors: .    Procedure(s):  [facility]:  69844 Chiropractic manipulative treatment 1-2 regions performed   Thoracic: Diversified, See above for level, Prone and Lumbar: Diversified, See above for level, Side posture    Modalities:  None performed this visit    Therapeutic procedures:  None    Plan:  Treatment plan: 2 times per week for 2 weeks.  Instructed patient: stretch as instructed at visit.  Short term goals: increase ROM.  Long term goals: increase ADL.  Prognosis: very good.

## 2021-12-06 ENCOUNTER — OFFICE VISIT (OUTPATIENT)
Dept: CHIROPRACTIC MEDICINE | Facility: OTHER | Age: 26
End: 2021-12-06
Attending: CHIROPRACTOR
Payer: COMMERCIAL

## 2021-12-06 DIAGNOSIS — M54.50 ACUTE BILATERAL LOW BACK PAIN WITHOUT SCIATICA: ICD-10-CM

## 2021-12-06 DIAGNOSIS — M99.03 SEGMENTAL AND SOMATIC DYSFUNCTION OF LUMBAR REGION: Primary | ICD-10-CM

## 2021-12-06 DIAGNOSIS — M99.02 SEGMENTAL AND SOMATIC DYSFUNCTION OF THORACIC REGION: ICD-10-CM

## 2021-12-06 PROCEDURE — 98940 CHIROPRACT MANJ 1-2 REGIONS: CPT | Mod: AT | Performed by: CHIROPRACTOR

## 2021-12-07 NOTE — PROGRESS NOTES
Subjective Finding:    Chief compalint: Patient presents with:  Back Pain  , Pain Scale: 6/10, Intensity: sharp, Duration: 2 days, Radiating: down right leg.    Date of injury:     Activities that the pain restricts:   Home/household/hobbies/social activities: yes.  Work duties: yes.  Sleep: yes.  Makes symptoms better: rest.  Makes symptoms worse: activity.  Have you seen anyone else for the symptoms? Yes: MD.  Work related: no.  Automobile related injury: no.    Objective and Assessment:    Posture Analysis:   High shoulder: .  Head tilt: .  High iliac crest: right.  Head carriage: neutral.  Thoracic Kyphosis: forward.  Lumbar Lordosis: forward.    Lumbar Range of Motion: extension decreased, left lateral flexion decreased and right lateral flexion decreased.  Cervical Range of Motion: .  Thoracic Range of Motion: extension decreased.  Extremity Range of Motion: .    Palpation:   Quad lumb: bilateral, referred pain: yes    Segmental dysfunction pre-treatment and treatment area: T11, L5 and PSIS Right. C6  Assessment post-treatment:  Cervical: .  Thoracic: ROM increased.  Lumbar: ROM increased.    Comments: .      Complicating Factors: .    Procedure(s):  [facility]:  38262 Chiropractic manipulative treatment 1-2 regions performed   Thoracic: Diversified, See above for level, Prone and Lumbar: Diversified, See above for level, Side posture    Modalities:  None performed this visit    Therapeutic procedures:  None    Plan:  Treatment plan: 2 times per week for 2 weeks.  Instructed patient: stretch as instructed at visit.  Short term goals: increase ROM.  Long term goals: increase ADL.  Prognosis: very good.

## 2021-12-20 ENCOUNTER — OFFICE VISIT (OUTPATIENT)
Dept: CHIROPRACTIC MEDICINE | Facility: OTHER | Age: 26
End: 2021-12-20
Attending: CHIROPRACTOR
Payer: COMMERCIAL

## 2021-12-20 DIAGNOSIS — M99.03 SEGMENTAL AND SOMATIC DYSFUNCTION OF LUMBAR REGION: Primary | ICD-10-CM

## 2021-12-20 DIAGNOSIS — M99.02 SEGMENTAL AND SOMATIC DYSFUNCTION OF THORACIC REGION: ICD-10-CM

## 2021-12-20 DIAGNOSIS — M54.50 ACUTE BILATERAL LOW BACK PAIN WITHOUT SCIATICA: ICD-10-CM

## 2021-12-20 PROCEDURE — 98940 CHIROPRACT MANJ 1-2 REGIONS: CPT | Mod: AT | Performed by: CHIROPRACTOR

## 2021-12-22 NOTE — PROGRESS NOTES
Subjective Finding:    Chief compalint: Patient presents with:  Back Pain  Neck Pain  , Pain Scale: 6/10, Intensity: sharp, Duration: 2 days, Radiating: down right leg.    Date of injury:     Activities that the pain restricts:   Home/household/hobbies/social activities: yes.  Work duties: yes.  Sleep: yes.  Makes symptoms better: rest.  Makes symptoms worse: activity.  Have you seen anyone else for the symptoms? Yes: MD.  Work related: no.  Automobile related injury: no.    Objective and Assessment:    Posture Analysis:   High shoulder: .  Head tilt: .  High iliac crest: right.  Head carriage: neutral.  Thoracic Kyphosis: forward.  Lumbar Lordosis: forward.    Lumbar Range of Motion: extension decreased, left lateral flexion decreased and right lateral flexion decreased.  Cervical Range of Motion: .  Thoracic Range of Motion: extension decreased.  Extremity Range of Motion: .    Palpation:   Quad lumb: bilateral, referred pain: yes    Segmental dysfunction pre-treatment and treatment area: T11, L5 and PSIS Right. C6  Assessment post-treatment:  Cervical: .  Thoracic: ROM increased.  Lumbar: ROM increased.    Comments: .      Complicating Factors: .    Procedure(s):  [facility]:  48902 Chiropractic manipulative treatment 1-2 regions performed   Thoracic: Diversified, See above for level, Prone and Lumbar: Diversified, See above for level, Side posture    Modalities:  None performed this visit    Therapeutic procedures:  None    Plan:  Treatment plan: 2 times per week for 2 weeks.  Instructed patient: stretch as instructed at visit.  Short term goals: increase ROM.  Long term goals: increase ADL.  Prognosis: very good.

## 2022-01-03 ENCOUNTER — OFFICE VISIT (OUTPATIENT)
Dept: CHIROPRACTIC MEDICINE | Facility: OTHER | Age: 27
End: 2022-01-03
Attending: CHIROPRACTOR
Payer: COMMERCIAL

## 2022-01-03 DIAGNOSIS — M99.03 SEGMENTAL AND SOMATIC DYSFUNCTION OF LUMBAR REGION: Primary | ICD-10-CM

## 2022-01-03 DIAGNOSIS — M54.50 ACUTE BILATERAL LOW BACK PAIN WITHOUT SCIATICA: ICD-10-CM

## 2022-01-03 DIAGNOSIS — M99.02 SEGMENTAL AND SOMATIC DYSFUNCTION OF THORACIC REGION: ICD-10-CM

## 2022-01-03 PROCEDURE — 99212 OFFICE O/P EST SF 10 MIN: CPT | Mod: 25 | Performed by: CHIROPRACTOR

## 2022-01-03 PROCEDURE — 98940 CHIROPRACT MANJ 1-2 REGIONS: CPT | Mod: AT | Performed by: CHIROPRACTOR

## 2022-01-03 NOTE — PROGRESS NOTES
Subjective Finding:    Chief compalint: Patient presents with:  Back Pain  , Pain Scale: 6/10, Intensity: sharp, Duration: 2 days, Radiating: down right leg.    Date of injury:     Activities that the pain restricts:   Home/household/hobbies/social activities: yes.  Work duties: yes.  Sleep: yes.  Makes symptoms better: rest.  Makes symptoms worse: activity.  Have you seen anyone else for the symptoms? Yes: MD.  Work related: no.  Automobile related injury: no.    Objective and Assessment:    Posture Analysis:   High shoulder: .  Head tilt: .  High iliac crest: right.  Head carriage: neutral.  Thoracic Kyphosis: forward.  Lumbar Lordosis: forward.    Lumbar Range of Motion: extension decreased, left lateral flexion decreased and right lateral flexion decreased.  Cervical Range of Motion: .  Thoracic Range of Motion: extension decreased.  Extremity Range of Motion: .    Palpation:   Quad lumb: bilateral, referred pain: yes    Segmental dysfunction pre-treatment and treatment area: T11, L5 and PSIS Right. C6  Assessment post-treatment:  Cervical: .  Thoracic: ROM increased.  Lumbar: ROM increased.    Comments: .      Complicating Factors: .    Procedure(s):  [facility]:  23223 Chiropractic manipulative treatment 1-2 regions performed   Thoracic: Diversified, See above for level, Prone and Lumbar: Diversified, See above for level, Side posture    Modalities:  None performed this visit    Therapeutic procedures:  None    Plan:  Treatment plan: 2 times per week for 2 weeks.  Instructed patient: stretch as instructed at visit.  Short term goals: increase ROM.  Long term goals: increase ADL.  Prognosis: very good.

## 2022-01-17 ENCOUNTER — OFFICE VISIT (OUTPATIENT)
Dept: CHIROPRACTIC MEDICINE | Facility: OTHER | Age: 27
End: 2022-01-17
Attending: CHIROPRACTOR
Payer: COMMERCIAL

## 2022-01-17 DIAGNOSIS — M54.50 ACUTE BILATERAL LOW BACK PAIN WITHOUT SCIATICA: ICD-10-CM

## 2022-01-17 DIAGNOSIS — M99.03 SEGMENTAL AND SOMATIC DYSFUNCTION OF LUMBAR REGION: Primary | ICD-10-CM

## 2022-01-17 DIAGNOSIS — M99.02 SEGMENTAL AND SOMATIC DYSFUNCTION OF THORACIC REGION: ICD-10-CM

## 2022-01-17 DIAGNOSIS — M99.01 SEGMENTAL AND SOMATIC DYSFUNCTION OF CERVICAL REGION: ICD-10-CM

## 2022-01-17 PROCEDURE — 98941 CHIROPRACT MANJ 3-4 REGIONS: CPT | Mod: AT | Performed by: CHIROPRACTOR

## 2022-01-17 NOTE — PROGRESS NOTES
Subjective Finding:    Chief compalint: Patient presents with:  Back Pain  , Pain Scale: 6/10, Intensity: sharp, Duration: 2 days, Radiating: down right leg.    Date of injury:     Activities that the pain restricts:   Home/household/hobbies/social activities: yes.  Work duties: yes.  Sleep: yes.  Makes symptoms better: rest.  Makes symptoms worse: activity.  Have you seen anyone else for the symptoms? Yes: MD.  Work related: no.  Automobile related injury: no.    Objective and Assessment:    Posture Analysis:   High shoulder: .  Head tilt: .  High iliac crest: right.  Head carriage: neutral.  Thoracic Kyphosis: forward.  Lumbar Lordosis: forward.    Lumbar Range of Motion: extension decreased, left lateral flexion decreased and right lateral flexion decreased.  Cervical Range of Motion: .  Thoracic Range of Motion: extension decreased.  Extremity Range of Motion: .    Palpation:   Quad lumb: bilateral, referred pain: yes    Segmental dysfunction pre-treatment and treatment area: T11, L5 and PSIS Right. C6  Assessment post-treatment:  Cervical: .  Thoracic: ROM increased.  Lumbar: ROM increased.    Comments: .      Complicating Factors: .    Procedure(s):  [facility]:  63489 Chiropractic manipulative treatment 1-2 regions performed   Thoracic: Diversified, See above for level, Prone and Lumbar: Diversified, See above for level, Side posture    Modalities:  None performed this visit    Therapeutic procedures:  None    Plan:  Treatment plan: 2 times per week for 2 weeks.  Instructed patient: stretch as instructed at visit.  Short term goals: increase ROM.  Long term goals: increase ADL.  Prognosis: very good.

## 2022-01-31 ENCOUNTER — OFFICE VISIT (OUTPATIENT)
Dept: CHIROPRACTIC MEDICINE | Facility: OTHER | Age: 27
End: 2022-01-31
Attending: CHIROPRACTOR
Payer: COMMERCIAL

## 2022-01-31 DIAGNOSIS — M54.50 ACUTE BILATERAL LOW BACK PAIN WITHOUT SCIATICA: ICD-10-CM

## 2022-01-31 DIAGNOSIS — M99.03 SEGMENTAL AND SOMATIC DYSFUNCTION OF LUMBAR REGION: Primary | ICD-10-CM

## 2022-01-31 DIAGNOSIS — M99.01 SEGMENTAL AND SOMATIC DYSFUNCTION OF CERVICAL REGION: ICD-10-CM

## 2022-01-31 DIAGNOSIS — M99.02 SEGMENTAL AND SOMATIC DYSFUNCTION OF THORACIC REGION: ICD-10-CM

## 2022-01-31 PROCEDURE — 98941 CHIROPRACT MANJ 3-4 REGIONS: CPT | Mod: AT | Performed by: CHIROPRACTOR

## 2022-02-01 NOTE — PROGRESS NOTES
Subjective Finding:    Chief compalint: Patient presents with:  Back Pain  Neck Pain  , Pain Scale: 6/10, Intensity: sharp, Duration: 2 days, Radiating: down right leg.    Date of injury:     Activities that the pain restricts:   Home/household/hobbies/social activities: yes.  Work duties: yes.  Sleep: yes.  Makes symptoms better: rest.  Makes symptoms worse: activity.  Have you seen anyone else for the symptoms? Yes: MD.  Work related: no.  Automobile related injury: no.    Objective and Assessment:    Posture Analysis:   High shoulder: .  Head tilt: .  High iliac crest: right.  Head carriage: neutral.  Thoracic Kyphosis: forward.  Lumbar Lordosis: forward.    Lumbar Range of Motion: extension decreased, left lateral flexion decreased and right lateral flexion decreased.  Cervical Range of Motion: .  Thoracic Range of Motion: extension decreased.  Extremity Range of Motion: .    Palpation:   Quad lumb: bilateral, referred pain: yes    Segmental dysfunction pre-treatment and treatment area: T11, L5 and PSIS Right. C6  Assessment post-treatment:  Cervical: .  Thoracic: ROM increased.  Lumbar: ROM increased.    Comments: .      Complicating Factors: .    Procedure(s):  [facility]:  69747 Chiropractic manipulative treatment 1-2 regions performed   Thoracic: Diversified, See above for level, Prone and Lumbar: Diversified, See above for level, Side posture    Modalities:  None performed this visit    Therapeutic procedures:  None    Plan:  Treatment plan: 2 times per week for 2 weeks.  Instructed patient: stretch as instructed at visit.  Short term goals: increase ROM.  Long term goals: increase ADL.  Prognosis: very good.

## 2022-02-14 ENCOUNTER — OFFICE VISIT (OUTPATIENT)
Dept: CHIROPRACTIC MEDICINE | Facility: OTHER | Age: 27
End: 2022-02-14
Attending: CHIROPRACTOR
Payer: COMMERCIAL

## 2022-02-14 DIAGNOSIS — M99.03 SEGMENTAL AND SOMATIC DYSFUNCTION OF LUMBAR REGION: ICD-10-CM

## 2022-02-14 DIAGNOSIS — M99.01 SEGMENTAL AND SOMATIC DYSFUNCTION OF CERVICAL REGION: Primary | ICD-10-CM

## 2022-02-14 DIAGNOSIS — M99.02 SEGMENTAL AND SOMATIC DYSFUNCTION OF THORACIC REGION: ICD-10-CM

## 2022-02-14 DIAGNOSIS — M54.2 CERVICALGIA: ICD-10-CM

## 2022-02-14 PROCEDURE — 98941 CHIROPRACT MANJ 3-4 REGIONS: CPT | Mod: AT | Performed by: CHIROPRACTOR

## 2022-02-16 NOTE — PROGRESS NOTES
Subjective Finding:    Chief compalint: Patient presents with:  Back Pain  Neck Pain  , Pain Scale: 6/10, Intensity: sharp, Duration: 2 days, Radiating: down right leg.    Date of injury:     Activities that the pain restricts:   Home/household/hobbies/social activities: yes.  Work duties: yes.  Sleep: yes.  Makes symptoms better: rest.  Makes symptoms worse: activity.  Have you seen anyone else for the symptoms? Yes: MD.  Work related: no.  Automobile related injury: no.    Objective and Assessment:    Posture Analysis:   High shoulder: .  Head tilt: .  High iliac crest: right.  Head carriage: neutral.  Thoracic Kyphosis: forward.  Lumbar Lordosis: forward.    Lumbar Range of Motion: extension decreased, left lateral flexion decreased and right lateral flexion decreased.  Cervical Range of Motion: .  Thoracic Range of Motion: extension decreased.  Extremity Range of Motion: .    Palpation:   Quad lumb: bilateral, referred pain: yes    Segmental dysfunction pre-treatment and treatment area: T11, L5 and PSIS Right. C6  Assessment post-treatment:  Cervical: .  Thoracic: ROM increased.  Lumbar: ROM increased.    Comments: .      Complicating Factors: .    Procedure(s):  [facility]:  69891 Chiropractic manipulative treatment 1-2 regions performed   Thoracic: Diversified, See above for level, Prone and Lumbar: Diversified, See above for level, Side posture    Modalities:  None performed this visit    Therapeutic procedures:  None    Plan:  Treatment plan: 2 times per week for 2 weeks.  Instructed patient: stretch as instructed at visit.  Short term goals: increase ROM.  Long term goals: increase ADL.  Prognosis: very good.

## 2022-02-28 ENCOUNTER — OFFICE VISIT (OUTPATIENT)
Dept: CHIROPRACTIC MEDICINE | Facility: OTHER | Age: 27
End: 2022-02-28
Attending: CHIROPRACTOR
Payer: COMMERCIAL

## 2022-02-28 DIAGNOSIS — M54.50 ACUTE BILATERAL LOW BACK PAIN WITHOUT SCIATICA: ICD-10-CM

## 2022-02-28 DIAGNOSIS — M99.03 SEGMENTAL AND SOMATIC DYSFUNCTION OF LUMBAR REGION: Primary | ICD-10-CM

## 2022-02-28 DIAGNOSIS — M99.01 SEGMENTAL AND SOMATIC DYSFUNCTION OF CERVICAL REGION: ICD-10-CM

## 2022-02-28 DIAGNOSIS — M99.02 SEGMENTAL AND SOMATIC DYSFUNCTION OF THORACIC REGION: ICD-10-CM

## 2022-02-28 PROCEDURE — 98941 CHIROPRACT MANJ 3-4 REGIONS: CPT | Mod: AT | Performed by: CHIROPRACTOR

## 2022-02-28 NOTE — PROGRESS NOTES
Subjective Finding:    Chief compalint: Patient presents with:  Back Pain  Neck Pain  , Pain Scale: 6/10, Intensity: sharp, Duration: 2 days, Radiating: down right leg.    Date of injury:     Activities that the pain restricts:   Home/household/hobbies/social activities: yes.  Work duties: yes.  Sleep: yes.  Makes symptoms better: rest.  Makes symptoms worse: activity.  Have you seen anyone else for the symptoms? Yes: MD.  Work related: no.  Automobile related injury: no.    Objective and Assessment:    Posture Analysis:   High shoulder: .  Head tilt: .  High iliac crest: right.  Head carriage: neutral.  Thoracic Kyphosis: forward.  Lumbar Lordosis: forward.    Lumbar Range of Motion: extension decreased, left lateral flexion decreased and right lateral flexion decreased.  Cervical Range of Motion: .  Thoracic Range of Motion: extension decreased.  Extremity Range of Motion: .    Palpation:   Quad lumb: bilateral, referred pain: yes    Segmental dysfunction pre-treatment and treatment area: T11, L5 and PSIS Right. C6  Assessment post-treatment:  Cervical: .  Thoracic: ROM increased.  Lumbar: ROM increased.    Comments: .      Complicating Factors: .    Procedure(s):  [facility]:  24685 Chiropractic manipulative treatment 1-2 regions performed   Thoracic: Diversified, See above for level, Prone and Lumbar: Diversified, See above for level, Side posture    Modalities:  None performed this visit    Therapeutic procedures:  None    Plan:  Treatment plan: 2 times per week for 2 weeks.  Instructed patient: stretch as instructed at visit.  Short term goals: increase ROM.  Long term goals: increase ADL.  Prognosis: very good.

## 2022-07-05 ENCOUNTER — OFFICE VISIT (OUTPATIENT)
Dept: CHIROPRACTIC MEDICINE | Facility: OTHER | Age: 27
End: 2022-07-05
Attending: CHIROPRACTOR
Payer: COMMERCIAL

## 2022-07-05 DIAGNOSIS — M99.02 SEGMENTAL AND SOMATIC DYSFUNCTION OF THORACIC REGION: ICD-10-CM

## 2022-07-05 DIAGNOSIS — M99.03 SEGMENTAL AND SOMATIC DYSFUNCTION OF LUMBAR REGION: Primary | ICD-10-CM

## 2022-07-05 DIAGNOSIS — M99.01 SEGMENTAL AND SOMATIC DYSFUNCTION OF CERVICAL REGION: ICD-10-CM

## 2022-07-05 DIAGNOSIS — M54.50 ACUTE BILATERAL LOW BACK PAIN WITHOUT SCIATICA: ICD-10-CM

## 2022-07-05 PROCEDURE — 98941 CHIROPRACT MANJ 3-4 REGIONS: CPT | Mod: AT | Performed by: CHIROPRACTOR

## 2022-07-06 NOTE — PROGRESS NOTES
Subjective Finding:    Chief compalint: Patient presents with:  Back Pain  , Pain Scale: 6/10, Intensity: sharp, Duration: 2 days, Radiating: down right leg.    Date of injury:     Activities that the pain restricts:   Home/household/hobbies/social activities: yes.  Work duties: yes.  Sleep: yes.  Makes symptoms better: rest.  Makes symptoms worse: activity.  Have you seen anyone else for the symptoms? Yes: MD.  Work related: no.  Automobile related injury: no.    Objective and Assessment:    Posture Analysis:   High shoulder: .  Head tilt: .  High iliac crest: right.  Head carriage: neutral.  Thoracic Kyphosis: forward.  Lumbar Lordosis: forward.    Lumbar Range of Motion: extension decreased, left lateral flexion decreased and right lateral flexion decreased.  Cervical Range of Motion: .  Thoracic Range of Motion: extension decreased.  Extremity Range of Motion: .    Palpation:   Quad lumb: bilateral, referred pain: yes    Segmental dysfunction pre-treatment and treatment area: T11, L5 and PSIS Right. C6  Assessment post-treatment:  Cervical: .  Thoracic: ROM increased.  Lumbar: ROM increased.    Comments: .      Complicating Factors: .    Procedure(s):  [facility]:  38583 Chiropractic manipulative treatment 1-2 regions performed   Thoracic: Diversified, See above for level, Prone and Lumbar: Diversified, See above for level, Side posture    Modalities:  None performed this visit    Therapeutic procedures:  None    Plan:  Treatment plan: 2 times per week for 2 weeks.  Instructed patient: stretch as instructed at visit.  Short term goals: increase ROM.  Long term goals: increase ADL.  Prognosis: very good.

## 2022-08-30 ENCOUNTER — APPOINTMENT (OUTPATIENT)
Dept: GENERAL RADIOLOGY | Facility: HOSPITAL | Age: 27
End: 2022-08-30
Attending: PHYSICIAN ASSISTANT
Payer: COMMERCIAL

## 2022-08-30 ENCOUNTER — HOSPITAL ENCOUNTER (EMERGENCY)
Facility: HOSPITAL | Age: 27
Discharge: HOME | End: 2022-08-30
Attending: PHYSICIAN ASSISTANT | Admitting: PHYSICIAN ASSISTANT
Payer: COMMERCIAL

## 2022-08-30 VITALS
HEART RATE: 72 BPM | OXYGEN SATURATION: 96 % | RESPIRATION RATE: 16 BRPM | TEMPERATURE: 97.8 F | SYSTOLIC BLOOD PRESSURE: 154 MMHG | DIASTOLIC BLOOD PRESSURE: 96 MMHG

## 2022-08-30 DIAGNOSIS — S99.912A ANKLE INJURY, LEFT, INITIAL ENCOUNTER: ICD-10-CM

## 2022-08-30 DIAGNOSIS — S99.922A FOOT INJURY, LEFT, INITIAL ENCOUNTER: ICD-10-CM

## 2022-08-30 PROCEDURE — 99213 OFFICE O/P EST LOW 20 MIN: CPT | Performed by: PHYSICIAN ASSISTANT

## 2022-08-30 PROCEDURE — 73610 X-RAY EXAM OF ANKLE: CPT | Mod: LT

## 2022-08-30 PROCEDURE — 73630 X-RAY EXAM OF FOOT: CPT | Mod: LT

## 2022-08-30 PROCEDURE — G0463 HOSPITAL OUTPT CLINIC VISIT: HCPCS

## 2022-08-30 RX ORDER — MULTIPLE VITAMINS W/ MINERALS TAB 9MG-400MCG
1 TAB ORAL DAILY
COMMUNITY

## 2022-08-30 RX ORDER — VITAMIN B COMPLEX
1 TABLET ORAL DAILY
COMMUNITY

## 2022-08-30 ASSESSMENT — ACTIVITIES OF DAILY LIVING (ADL): ADLS_ACUITY_SCORE: 35

## 2022-08-30 NOTE — ED TRIAGE NOTES
Pt presents with c/o left ankle pain onset yesterday.  Pt ambulatory in triage. Ice pack given.

## 2022-08-30 NOTE — ED PROVIDER NOTES
History     Chief Complaint   Patient presents with     Ankle Pain     HPI  Edgar Garrett is a 26 year old male who presents to urgent care for evaluation of left ankle and foot pain.  Patient states that yesterday he was walking and tripped causing him to tweak his left foot and ankle.  He states that when he bears weight he has pain but when he is sitting he does not have pain.  Patient denies any previous fractures, dislocations or surgeries to left foot or ankle.  Patient took CBD for his pain.    Allergies:  Allergies   Allergen Reactions     Nka [No Known Allergies]        Problem List:    There are no problems to display for this patient.       Past Medical History:    No past medical history on file.    Past Surgical History:    No past surgical history on file.    Family History:    No family history on file.    Social History:  Marital Status:  Single [1]  Social History     Tobacco Use     Smoking status: Never Smoker   Substance Use Topics     Drug use: No        Medications:    ClonazePAM (KLONOPIN PO)  DiphenhydrAMINE HCl (BENADRYL ALLERGY PO)  IBUPROFEN PO  lisinopril (PRINIVIL/ZESTRIL) 10 MG tablet  multivitamin w/minerals (MULTI-VITAMIN) tablet  Omeprazole (PRILOSEC PO)  Vitamin D3 (CHOLECALCIFEROL) 25 mcg (1000 units) tablet          Review of Systems   Musculoskeletal:        Left foot injury  Left ankle injury     All other systems reviewed and are negative.      Physical Exam   BP: 154/96  Pulse: 72  Temp: 97.8  F (36.6  C)  Resp: 16  SpO2: 96 %      Physical Exam  Vitals and nursing note reviewed.   Constitutional:       General: He is not in acute distress.     Appearance: Normal appearance. He is not ill-appearing or toxic-appearing.   Cardiovascular:      Rate and Rhythm: Regular rhythm.      Heart sounds: Normal heart sounds.   Pulmonary:      Breath sounds: Normal breath sounds.   Musculoskeletal:        Feet:    Feet:      Comments: Patient has diffuse slight tenderness over lateral  aspect of upper left foot and left ankle.  Patient has full range of motion, strength 5 out of 5, CMS intact.  No obvious swelling, or bruising.  Neurological:      Mental Status: He is oriented to person, place, and time.         ED Course                 Procedures             Critical Care time:               Results for orders placed or performed during the hospital encounter of 08/30/22 (from the past 24 hour(s))   Foot XR, G/E 3 views, left    Narrative    PROCEDURE:  XR FOOT LEFT G/E 3 VIEWS    HISTORY: Rolled ankel    COMPARISON:  None.    TECHNIQUE:  3 views of the left foot were obtained.    FINDINGS:  No fracture or dislocation is identified. The joint spaces  are preserved.        Impression    IMPRESSION: No acute fracture.      MELISSA WALL MD         SYSTEM ID:  [id number]   XR Ankle Left G/E 3 Views    Narrative    PROCEDURE:  XR ANKLE LEFT G/E 3 VIEWS    HISTORY: rolled foot hard to walk now    COMPARISON:  None.    TECHNIQUE:  3 views of the left ankle were obtained.    FINDINGS:  No fracture or dislocation is identified. The joint spaces  are preserved.        Impression    IMPRESSION: Normal left ankle      MELISSA WALL MD         SYSTEM ID:  [id number]       Medications - No data to display    Assessments & Plan (with Medical Decision Making)   #1.  Left ankle injury  #2.  Left foot injury    Discussed exam findings as well as x-ray reading with patient.  Patient was fitted for ankle brace in urgent care today.  Weightbearing as tolerated.  He is highly encouraged to rest, elevate, and ice left foot.  Tylenol or ibuprofen as directed for pain.  Any additional concerns please return to urgent care or follow-up with primary care provider.  Patient verbalized understanding and agreement of plan.    I have reviewed the nursing notes.    I have reviewed the findings, diagnosis, plan and need for follow up with the patient.    New Prescriptions    No medications on file       Final diagnoses:    Ankle injury, left, initial encounter   Foot injury, left, initial encounter       8/30/2022   [facility]     Eh Kwok PA-C  08/30/22 1957

## 2022-08-30 NOTE — ED TRIAGE NOTES
Patient presents to urgent care with c/o left ankle pain. States it started yesterday. States he was hauling garbage yesterday and hit a small hole in his yard and twisted his ankle. States he believes he sprained it. Took 10 mg of CBD for pain at 12:30pm today.

## 2023-07-11 ENCOUNTER — OFFICE VISIT (OUTPATIENT)
Dept: CHIROPRACTIC MEDICINE | Facility: OTHER | Age: 28
End: 2023-07-11
Attending: CHIROPRACTOR
Payer: COMMERCIAL

## 2023-07-11 DIAGNOSIS — M99.01 SEGMENTAL AND SOMATIC DYSFUNCTION OF CERVICAL REGION: ICD-10-CM

## 2023-07-11 DIAGNOSIS — M54.50 ACUTE BILATERAL LOW BACK PAIN WITHOUT SCIATICA: ICD-10-CM

## 2023-07-11 DIAGNOSIS — M99.03 SEGMENTAL AND SOMATIC DYSFUNCTION OF LUMBAR REGION: Primary | ICD-10-CM

## 2023-07-11 DIAGNOSIS — M99.02 SEGMENTAL AND SOMATIC DYSFUNCTION OF THORACIC REGION: ICD-10-CM

## 2023-07-11 PROCEDURE — 99212 OFFICE O/P EST SF 10 MIN: CPT | Mod: 25 | Performed by: CHIROPRACTOR

## 2023-07-11 PROCEDURE — 98941 CHIROPRACT MANJ 3-4 REGIONS: CPT | Mod: AT | Performed by: CHIROPRACTOR

## 2023-07-17 NOTE — PROGRESS NOTES
Subjective Finding:    Chief compalint: Patient presents with:  Back Pain: Back stiffness and neck stiffness.  Ongoing for several days now and no change in pain level    , Pain Scale: 4/10, Intensity: dull ache, Duration: 2 weeks, Radiating: no.    Date of injury:     Activities that the pain restricts:   Home/household/hobbies/social activities: Yes.  Work duties: No.  Sleep: No.  Makes symptoms better: rest.  Makes symptoms worse: activity, lumbar flexion and cervical flexion.  Have you seen anyone else for the symptoms? No.  Work related: No.  Automobile related injury: No.    Objective and Assessment:    Posture Analysis:   High shoulder: .  Head tilt: .  High iliac crest: .  Head carriage: forward.  Thoracic Kyphosis: neutral.  Lumbar Lordosis: forward.    Lumbar Range of Motion: extension decreased.  Cervical Range of Motion: extension decreased.  Thoracic Range of Motion: extension decreased.  Extremity Range of Motion: .    Palpation:   Quad lumb: bilateral, referred pain: no  Lev scapulae: sharp pain, referred pain: no    Segmental dysfunction pre-treatment and treatment area: C2, C3, T5, L4 and L5.    Assessment post-treatment:  Cervical: ROM increased.  Thoracic: ROM increased.  Lumbar: ROM increased.    Comments: .      Complicating Factors: .    Procedure(s):  [facility]:  47162 Chiropractic manipulative treatment 3-4 regions performed   Cervical: Diversified, See above for level, Supine, Thoracic: Diversified, See above for level, Prone and Lumbar: Diversified, See above for level, Side posture    Modalities:  None performed this visit    Therapeutic procedures:  None    Plan:  Treatment plan: 2 times per week for 1 weeks.  Instructed patient: stretch as instructed at visit.  Short term goals: improve ADL.  Long term goals: increase ADL.  Prognosis: excellent.

## 2023-12-21 ENCOUNTER — HOSPITAL ENCOUNTER (EMERGENCY)
Facility: HOSPITAL | Age: 28
Discharge: HOME | End: 2023-12-21
Payer: COMMERCIAL

## 2023-12-21 ENCOUNTER — OFFICE VISIT (OUTPATIENT)
Dept: CHIROPRACTIC MEDICINE | Facility: OTHER | Age: 28
End: 2023-12-21
Payer: COMMERCIAL

## 2023-12-21 VITALS
TEMPERATURE: 99.5 F | DIASTOLIC BLOOD PRESSURE: 81 MMHG | RESPIRATION RATE: 19 BRPM | OXYGEN SATURATION: 96 % | SYSTOLIC BLOOD PRESSURE: 135 MMHG | HEART RATE: 80 BPM

## 2023-12-21 DIAGNOSIS — M99.01 SEGMENTAL AND SOMATIC DYSFUNCTION OF CERVICAL REGION: ICD-10-CM

## 2023-12-21 DIAGNOSIS — J03.90 TONSILLITIS: ICD-10-CM

## 2023-12-21 DIAGNOSIS — M99.02 SEGMENTAL AND SOMATIC DYSFUNCTION OF THORACIC REGION: ICD-10-CM

## 2023-12-21 DIAGNOSIS — M99.03 SEGMENTAL AND SOMATIC DYSFUNCTION OF LUMBAR REGION: Primary | ICD-10-CM

## 2023-12-21 DIAGNOSIS — M54.50 ACUTE BILATERAL LOW BACK PAIN WITHOUT SCIATICA: ICD-10-CM

## 2023-12-21 LAB — GROUP A STREP BY PCR: DETECTED

## 2023-12-21 PROCEDURE — 98941 CHIROPRACT MANJ 3-4 REGIONS: CPT | Mod: AT | Performed by: CHIROPRACTOR

## 2023-12-21 PROCEDURE — 250N000009 HC RX 250

## 2023-12-21 PROCEDURE — 87651 STREP A DNA AMP PROBE: CPT

## 2023-12-21 PROCEDURE — 99213 OFFICE O/P EST LOW 20 MIN: CPT

## 2023-12-21 PROCEDURE — G0463 HOSPITAL OUTPT CLINIC VISIT: HCPCS

## 2023-12-21 RX ORDER — ELECTROLYTES/DEXTROSE
1 SOLUTION, ORAL ORAL DAILY
COMMUNITY

## 2023-12-21 RX ORDER — DEXAMETHASONE SODIUM PHOSPHATE 10 MG/ML
10 INJECTION INTRAMUSCULAR; INTRAVENOUS ONCE
Status: COMPLETED | OUTPATIENT
Start: 2023-12-21 | End: 2023-12-21

## 2023-12-21 RX ORDER — PROPRANOLOL HYDROCHLORIDE 20 MG/1
20 TABLET ORAL
COMMUNITY
Start: 2023-10-16

## 2023-12-21 RX ADMIN — DEXAMETHASONE SODIUM PHOSPHATE 10 MG: 10 INJECTION INTRAMUSCULAR; INTRAVENOUS at 13:49

## 2023-12-21 ASSESSMENT — ENCOUNTER SYMPTOMS
APPETITE CHANGE: 0
COUGH: 0
FATIGUE: 1
NAUSEA: 0
SORE THROAT: 1
CHILLS: 0
SHORTNESS OF BREATH: 0
VOMITING: 0
RHINORRHEA: 0
DIARRHEA: 0
FEVER: 0
ACTIVITY CHANGE: 0

## 2023-12-21 NOTE — ED PROVIDER NOTES
History     Chief Complaint   Patient presents with     Pharyngitis     HPI  Edgar Garrett is a 28 year old male who presents to the urgent care with a 2 day history of sore throat with blood coming from tonsils. He denies swallowing difficulty, fevers, chills, cough, shortness of breath, and n/v/d. No recent abx or OTC medications. Hx of frequent tonsil stones. Recent strep exposures.     Allergies:  No Known Allergies    Problem List:    There are no problems to display for this patient.       Past Medical History:    No past medical history on file.    Past Surgical History:    No past surgical history on file.    Family History:    No family history on file.    Social History:  Marital Status:  Single [1]  Social History     Tobacco Use     Smoking status: Never   Substance Use Topics     Drug use: No        Medications:    amoxicillin-clavulanate (AUGMENTIN) 875-125 MG tablet  CALCIUM-MAGNESIUM-ZINC PO  lisinopril (PRINIVIL/ZESTRIL) 10 MG tablet  Multiple Vitamin (MULTIVITAMIN ADULT) TABS  omeprazole (PRILOSEC) 20 MG DR capsule  propranolol (INDERAL) 20 MG tablet  Vitamin D3 (CHOLECALCIFEROL) 25 mcg (1000 units) tablet  ClonazePAM (KLONOPIN PO)  DiphenhydrAMINE HCl (BENADRYL ALLERGY PO)  IBUPROFEN PO  multivitamin w/minerals (MULTI-VITAMIN) tablet  Omeprazole (PRILOSEC PO)          Review of Systems   Constitutional:  Positive for fatigue. Negative for activity change, appetite change, chills and fever.   HENT:  Positive for sore throat. Negative for congestion and rhinorrhea.    Respiratory:  Negative for cough and shortness of breath.    Gastrointestinal:  Negative for diarrhea, nausea and vomiting.   All other systems reviewed and are negative.      Physical Exam   BP: 135/81  Pulse: 80  Temp: 99.5  F (37.5  C)  Resp: 19  SpO2: 96 %      Physical Exam  Vitals reviewed.   Constitutional:       General: He is not in acute distress.     Appearance: He is well-developed. He is ill-appearing. He is not  toxic-appearing or diaphoretic.   HENT:      Head:      Jaw: No trismus, tenderness or pain on movement.      Right Ear: Tympanic membrane and ear canal normal. No middle ear effusion. Tympanic membrane is not erythematous.      Left Ear: Tympanic membrane and ear canal normal.  No middle ear effusion. Tympanic membrane is not erythematous.      Mouth/Throat:      Mouth: Mucous membranes are moist.      Pharynx: Uvula midline. Oropharyngeal exudate and posterior oropharyngeal erythema present. No uvula swelling.      Tonsils: Tonsillar exudate present. No tonsillar abscesses. 2+ on the right. 2+ on the left.   Cardiovascular:      Rate and Rhythm: Normal rate and regular rhythm.      Heart sounds: Normal heart sounds. No murmur heard.  Neurological:      Mental Status: He is alert.       ED Course                 Procedures              Results for orders placed or performed during the hospital encounter of 12/21/23 (from the past 24 hour(s))   Group A Streptococcus PCR Throat Swab    Specimen: Throat; Swab   Result Value Ref Range    Group A strep by PCR Detected (A) Not Detected    Narrative    The Xpert Xpress Strep A test, performed on the [employer], is a rapid, qualitative in vitro diagnostic test for the detection of Streptococcus pyogenes (Group A ß-hemolytic Streptococcus, Strep A) in throat swab specimens from patients with signs and symptoms of pharyngitis. The Xpert Xpress Strep A test can be used as an aid in the diagnosis of Group A Streptococcal pharyngitis. The assay is not intended to monitor treatment for Group A Streptococcus infections. The Xpert Xpress Strep A test utilizes an automated real-time polymerase chain reaction (PCR) to detect Streptococcus pyogenes DNA.       Medications   dexAMETHasone (DECADRON) injectable solution used ORALLY 10 mg (10 mg Oral $Given 12/21/23 134)       Assessments & Plan (with Medical Decision Making)     I have reviewed the nursing  notes.    I have reviewed the findings, diagnosis, plan and need for follow up with the patient.  Edgar Garrett is a 28 year old male who presents to the urgent care with a 2 day history of sore throat with blood coming from tonsils. He denies swallowing difficulty, fevers, chills, cough, shortness of breath, and n/v/d. No recent abx or OTC medications. Hx of frequent tonsil stones. Recent strep exposures.     MDM: strep positive.   Vital signs normal, afebrile. Lungs clear, heart tones regular. Tonsils 2+, left slightly larger. There is significant erythema to posterior oropharynx. Uvula midline. There is no trismus or drooling. No visible peritonsillar abscess. Due to clinical presentation will treat with augmentin. One dose of oral decadron given in UC. Non toxic in appearance with no noted distress. ENT referral placed .Supportive measures and return precautions discussed. He is in agreement with plan.     (J03.90) Tonsillitis  Plan: Adult ENT  Referral  Antibiotics 2 times daily for 10 days. Yogurt or probiotic while taking.   Push fluids. Cool liquids, honey, and salt water gargles to sooth throat.   Return with any drooling, difficulty opening mouth, swallowing difficulty, or other concerns.   They will be calling you from ENT. Follow up in the clinic with PCP as needed. Understanding verbalized.          Discharge Medication List as of 12/21/2023  1:47 PM        START taking these medications    Details   amoxicillin-clavulanate (AUGMENTIN) 875-125 MG tablet Take 1 tablet by mouth 2 times daily for 10 days, Disp-20 tablet, R-0, E-Prescribe             Final diagnoses:   Tonsillitis       12/21/2023   [facility]       Shannon Naylor NP  12/21/23 0657

## 2023-12-21 NOTE — DISCHARGE INSTRUCTIONS
Antibiotics 2 times daily for 10 days. Yogurt or probiotic while taking.   Push fluids. Cool liquids, honey, and salt water gargles to sooth throat.   Return with any drooling, difficulty opening mouth, swallowing difficulty, or other concerns.   They will be calling you from ENT. Follow up in the clinic with PCP as needed.

## 2023-12-21 NOTE — ED TRIAGE NOTES
Pt presents with c/o bleeding tonsils  States that he does get tonsils stones.  Sore throat, bleeding tonsils, chills, headaches, up set stomach   On going for a few days  No hi meds

## 2023-12-21 NOTE — Clinical Note
Edgar Marquezdivyasmith was seen and treated in our emergency department on 12/21/2023.  He may return to work on 12/26/2023.       If you have any questions or concerns, please don't hesitate to call.      Shannon Naylor, NP

## 2023-12-21 NOTE — PROGRESS NOTES
Subjective Finding:    Chief compalint: Patient presents with:  Back Pain , Pain Scale: 6/10, Intensity: dull, Duration: 2 days, Radiating: bilateral buttock.    Date of injury:     Activities that the pain restricts:   Home/household/hobbies/social activities: Yes.  Work duties: Yes.  Sleep: Yes.  Makes symptoms better: rest.  Makes symptoms worse: activity and lumbar flexion.  Have you seen anyone else for the symptoms? No.  Work related: No.  Automobile related injury: No.    Objective and Assessment:    Posture Analysis:   High shoulder: .  Head tilt: .  High iliac crest: .  Head carriage: neutral.  Thoracic Kyphosis: neutral.  Lumbar Lordosis: forward.    Lumbar Range of Motion: extension decreased.  Cervical Range of Motion: .  Thoracic Range of Motion: .  Extremity Range of Motion: .    Palpation:   Quad lumb: bilateral, referred pain: no    Segmental dysfunction pre-treatment and treatment area: C5, C6, T4, L4, and L5.    Assessment post-treatment:  Cervical: ROM increased.  Thoracic: ROM increased.  Lumbar: ROM increased.    Comments: .      Complicating Factors: .    Procedure(s):  CMT:  27106 Chiropractic manipulative treatment 3-4 regions performed   Cervical: Diversified, See above for level, Supine, Thoracic: Diversified, See above for level, Prone, and Lumbar: Diversified, See above for level, Side posture    Modalities:  None performed this visit    Therapeutic procedures:  None    Plan:  Treatment plan:  2 times per week for 1 weeks.  Instructed patient: stretch as instructed at visit.  Short term goals: increase ROM.  Long term goals: restore normal function.  Prognosis: very good.

## 2023-12-26 ENCOUNTER — HOSPITAL ENCOUNTER (EMERGENCY)
Facility: HOSPITAL | Age: 28
Discharge: HOME | End: 2023-12-26
Attending: PHYSICIAN ASSISTANT | Admitting: PHYSICIAN ASSISTANT
Payer: COMMERCIAL

## 2023-12-26 ENCOUNTER — APPOINTMENT (OUTPATIENT)
Dept: CT IMAGING | Facility: HOSPITAL | Age: 28
End: 2023-12-26
Attending: PHYSICIAN ASSISTANT
Payer: COMMERCIAL

## 2023-12-26 VITALS
RESPIRATION RATE: 18 BRPM | TEMPERATURE: 97.3 F | HEART RATE: 77 BPM | OXYGEN SATURATION: 96 % | DIASTOLIC BLOOD PRESSURE: 100 MMHG | SYSTOLIC BLOOD PRESSURE: 152 MMHG

## 2023-12-26 DIAGNOSIS — K57.92 DIVERTICULITIS: ICD-10-CM

## 2023-12-26 LAB
ALBUMIN SERPL BCG-MCNC: 3.9 G/DL (ref 3.5–5.2)
ALBUMIN UR-MCNC: NEGATIVE MG/DL
ALP SERPL-CCNC: 74 U/L (ref 40–150)
ALT SERPL W P-5'-P-CCNC: 41 U/L (ref 0–70)
ANION GAP SERPL CALCULATED.3IONS-SCNC: 10 MMOL/L (ref 7–15)
APPEARANCE UR: CLEAR
AST SERPL W P-5'-P-CCNC: 19 U/L (ref 0–45)
BASOPHILS # BLD AUTO: 0 10E3/UL (ref 0–0.2)
BASOPHILS NFR BLD AUTO: 0 %
BILIRUB SERPL-MCNC: 0.3 MG/DL
BILIRUB UR QL STRIP: NEGATIVE
BUN SERPL-MCNC: 11.5 MG/DL (ref 6–20)
CALCIUM SERPL-MCNC: 9.2 MG/DL (ref 8.6–10)
CHLORIDE SERPL-SCNC: 98 MMOL/L (ref 98–107)
COLOR UR AUTO: NORMAL
CREAT SERPL-MCNC: 0.74 MG/DL (ref 0.67–1.17)
CRP SERPL-MCNC: 10.6 MG/L
DEPRECATED HCO3 PLAS-SCNC: 25 MMOL/L (ref 22–29)
EGFRCR SERPLBLD CKD-EPI 2021: >90 ML/MIN/1.73M2
EOSINOPHIL # BLD AUTO: 0.2 10E3/UL (ref 0–0.7)
EOSINOPHIL NFR BLD AUTO: 1 %
ERYTHROCYTE [DISTWIDTH] IN BLOOD BY AUTOMATED COUNT: 13.3 % (ref 10–15)
GLUCOSE SERPL-MCNC: 89 MG/DL (ref 70–99)
GLUCOSE UR STRIP-MCNC: NEGATIVE MG/DL
HCT VFR BLD AUTO: 44.2 % (ref 40–53)
HGB BLD-MCNC: 15.7 G/DL (ref 13.3–17.7)
HGB UR QL STRIP: NEGATIVE
HOLD SPECIMEN: NORMAL
IMM GRANULOCYTES # BLD: 0.1 10E3/UL
IMM GRANULOCYTES NFR BLD: 0 %
KETONES UR STRIP-MCNC: NEGATIVE MG/DL
LEUKOCYTE ESTERASE UR QL STRIP: NEGATIVE
LYMPHOCYTES # BLD AUTO: 2.5 10E3/UL (ref 0.8–5.3)
LYMPHOCYTES NFR BLD AUTO: 22 %
MCH RBC QN AUTO: 29.7 PG (ref 26.5–33)
MCHC RBC AUTO-ENTMCNC: 35.5 G/DL (ref 31.5–36.5)
MCV RBC AUTO: 84 FL (ref 78–100)
MONOCYTES # BLD AUTO: 0.9 10E3/UL (ref 0–1.3)
MONOCYTES NFR BLD AUTO: 8 %
NEUTROPHILS # BLD AUTO: 7.8 10E3/UL (ref 1.6–8.3)
NEUTROPHILS NFR BLD AUTO: 69 %
NITRATE UR QL: NEGATIVE
NRBC # BLD AUTO: 0 10E3/UL
NRBC BLD AUTO-RTO: 0 /100
PH UR STRIP: 6 [PH] (ref 4.7–8)
PLATELET # BLD AUTO: 190 10E3/UL (ref 150–450)
POTASSIUM SERPL-SCNC: 4.2 MMOL/L (ref 3.4–5.3)
PROT SERPL-MCNC: 7.1 G/DL (ref 6.4–8.3)
RBC # BLD AUTO: 5.28 10E6/UL (ref 4.4–5.9)
RBC URINE: 0 /HPF
SODIUM SERPL-SCNC: 133 MMOL/L (ref 135–145)
SP GR UR STRIP: 1.01 (ref 1–1.03)
SQUAMOUS EPITHELIAL: 0 /HPF
UROBILINOGEN UR STRIP-MCNC: NORMAL MG/DL
WBC # BLD AUTO: 11.3 10E3/UL (ref 4–11)
WBC URINE: <1 /HPF

## 2023-12-26 PROCEDURE — 82040 ASSAY OF SERUM ALBUMIN: CPT | Performed by: PHYSICIAN ASSISTANT

## 2023-12-26 PROCEDURE — 99285 EMERGENCY DEPT VISIT HI MDM: CPT | Mod: 25

## 2023-12-26 PROCEDURE — 81001 URINALYSIS AUTO W/SCOPE: CPT | Performed by: PHYSICIAN ASSISTANT

## 2023-12-26 PROCEDURE — 86140 C-REACTIVE PROTEIN: CPT | Performed by: PHYSICIAN ASSISTANT

## 2023-12-26 PROCEDURE — 85025 COMPLETE CBC W/AUTO DIFF WBC: CPT | Performed by: PHYSICIAN ASSISTANT

## 2023-12-26 PROCEDURE — 74177 CT ABD & PELVIS W/CONTRAST: CPT

## 2023-12-26 PROCEDURE — 250N000011 HC RX IP 250 OP 636: Performed by: RADIOLOGY

## 2023-12-26 PROCEDURE — 99284 EMERGENCY DEPT VISIT MOD MDM: CPT | Performed by: PHYSICIAN ASSISTANT

## 2023-12-26 PROCEDURE — 36415 COLL VENOUS BLD VENIPUNCTURE: CPT | Performed by: PHYSICIAN ASSISTANT

## 2023-12-26 RX ORDER — IOPAMIDOL 755 MG/ML
125 INJECTION, SOLUTION INTRAVASCULAR ONCE
Status: COMPLETED | OUTPATIENT
Start: 2023-12-26 | End: 2023-12-26

## 2023-12-26 RX ADMIN — IOPAMIDOL 125 ML: 755 INJECTION, SOLUTION INTRAVENOUS at 19:21

## 2023-12-26 ASSESSMENT — ENCOUNTER SYMPTOMS: ROS GI COMMENTS: SEE HPI

## 2023-12-26 ASSESSMENT — ACTIVITIES OF DAILY LIVING (ADL): ADLS_ACUITY_SCORE: 35

## 2023-12-26 NOTE — ED TRIAGE NOTES
Seen by  provider for red flag symptom. Patient states he is having left side lower abdominal pain that started last night. Rates pain 7/10. States it might be a hernia as he has been lifting a lot. States it might be diverticulitis because it runs in his family. Also concerned he has IBS but states never diagnosed. Denies vomiting. C/o nausea and diarrhea.  provider informed patient he needs to be seen in the ER. Patient states he did recently finish antibiotics for a strep throat infection.      Triage Assessment (Adult)       Row Name 12/26/23 4945          Triage Assessment    Airway WDL WDL        Respiratory WDL    Respiratory WDL WDL

## 2023-12-27 LAB
HOLD SPECIMEN: NORMAL
HOLD SPECIMEN: NORMAL

## 2023-12-27 NOTE — ED NOTES
Pt presents ambulatory to ED 2 c/o LLQ abd pain that started this morning at 0300, rating 2/10. Patient reports experiencing this same pain 10 years ago, no findings when he went to ED to be evaluated. Area tender with palpation, occurs intermittently. Reports nausea this AM. Notes family hx diverticulitis. Lifted heavy recently so concerned for hernia. IV established and labs drawn. Patient aware of need for UA, able to obtain independently. Call light within reach, able to make needs known. Denies further needs at this time.

## 2023-12-27 NOTE — DISCHARGE INSTRUCTIONS
The antibiotic you are taking will treat this disease.  Finish the course of antibiotics that were prescribed for strep on the 21st.    Rest and stay hydrated.    Follow-up in the clinic in the next few days for recheck.    Return here for any new or worsening symptoms.

## 2023-12-27 NOTE — ED PROVIDER NOTES
History     Chief Complaint   Patient presents with    Abdominal Pain     The history is provided by the patient.     Edgar Garrett is a 28 year old male who presented to the emergency department ambulatory for evaluation of left lower quadrant abdominal pain.  Recently treated with Augmentin for strep throat but stopped the medication.  No fevers.  No vomiting.  No intra-abdominal surgeries.    Allergies:  No Known Allergies    Problem List:    There are no problems to display for this patient.       Past Medical History:    No past medical history on file.    Past Surgical History:    No past surgical history on file.    Family History:    No family history on file.    Social History:  Marital Status:  Single [1]  Social History     Tobacco Use    Smoking status: Never   Substance Use Topics    Drug use: No        Medications:    amoxicillin-clavulanate (AUGMENTIN) 875-125 MG tablet  CALCIUM-MAGNESIUM-ZINC PO  ClonazePAM (KLONOPIN PO)  DiphenhydrAMINE HCl (BENADRYL ALLERGY PO)  IBUPROFEN PO  lisinopril (PRINIVIL/ZESTRIL) 10 MG tablet  Multiple Vitamin (MULTIVITAMIN ADULT) TABS  multivitamin w/minerals (MULTI-VITAMIN) tablet  Omeprazole (PRILOSEC PO)  omeprazole (PRILOSEC) 20 MG DR capsule  propranolol (INDERAL) 20 MG tablet  Vitamin D3 (CHOLECALCIFEROL) 25 mcg (1000 units) tablet          Review of Systems   Gastrointestinal:         See HPI       Physical Exam   BP: 152/100  Pulse: 77  Temp: 97.3  F (36.3  C)  Resp: 18  SpO2: 96 %      Physical Exam  Vitals and nursing note reviewed.   Constitutional:       General: He is not in acute distress.     Appearance: Normal appearance. He is not ill-appearing, toxic-appearing or diaphoretic.   Cardiovascular:      Rate and Rhythm: Normal rate and regular rhythm.   Pulmonary:      Effort: Pulmonary effort is normal.   Abdominal:      Palpations: Abdomen is soft.      Comments: Very mild tenderness upon deep palpation of left lower quadrant.   Skin:     General: Skin  is warm and dry.      Capillary Refill: Capillary refill takes less than 2 seconds.   Neurological:      General: No focal deficit present.      Mental Status: He is alert and oriented to person, place, and time.         ED Course              ED Course as of 12/26/23 1953   Tue Dec 26, 2023   1931 My independent review of the CT scan of the abdomen and pelvis shows what appears to be diverticulitis without abscess or free air     Procedures              Critical Care time:  none               Results for orders placed or performed during the hospital encounter of 12/26/23 (from the past 24 hour(s))   CBC with platelets differential    Narrative    The following orders were created for panel order CBC with platelets differential.  Procedure                               Abnormality         Status                     ---------                               -----------         ------                     CBC with platelets and d...[[id number]]  Abnormal            Final result                 Please view results for these tests on the individual orders.   Comprehensive metabolic panel   Result Value Ref Range    Sodium 133 (L) 135 - 145 mmol/L    Potassium 4.2 3.4 - 5.3 mmol/L    Carbon Dioxide (CO2) 25 22 - 29 mmol/L    Anion Gap 10 7 - 15 mmol/L    Urea Nitrogen 11.5 6.0 - 20.0 mg/dL    Creatinine 0.74 0.67 - 1.17 mg/dL    GFR Estimate >90 >60 mL/min/1.73m2    Calcium 9.2 8.6 - 10.0 mg/dL    Chloride 98 98 - 107 mmol/L    Glucose 89 70 - 99 mg/dL    Alkaline Phosphatase 74 40 - 150 U/L    AST 19 0 - 45 U/L    ALT 41 0 - 70 U/L    Protein Total 7.1 6.4 - 8.3 g/dL    Albumin 3.9 3.5 - 5.2 g/dL    Bilirubin Total 0.3 <=1.2 mg/dL   CRP inflammation   Result Value Ref Range    CRP Inflammation 10.60 (H) <5.00 mg/L   Cucumber Draw    Narrative    The following orders were created for panel order Cucumber Draw.  Procedure                               Abnormality         Status                     ---------                                -----------         ------                     Extra Blue Top Tube[[id number]]                              Final result               Extra Red Top Tube[[id number]]                               Final result               Extra Green Top (Lithium...[[id number]]                                                 Extra Green Top (Lithium...[[id number]]                      Final result               Extra Purple Top Tube[[id number]]                                                         Please view results for these tests on the individual orders.   CBC with platelets and differential   Result Value Ref Range    WBC Count 11.3 (H) 4.0 - 11.0 10e3/uL    RBC Count 5.28 4.40 - 5.90 10e6/uL    Hemoglobin 15.7 13.3 - 17.7 g/dL    Hematocrit 44.2 40.0 - 53.0 %    MCV 84 78 - 100 fL    MCH 29.7 26.5 - 33.0 pg    MCHC 35.5 31.5 - 36.5 g/dL    RDW 13.3 10.0 - 15.0 %    Platelet Count 190 150 - 450 10e3/uL    % Neutrophils 69 %    % Lymphocytes 22 %    % Monocytes 8 %    % Eosinophils 1 %    % Basophils 0 %    % Immature Granulocytes 0 %    NRBCs per 100 WBC 0 <1 /100    Absolute Neutrophils 7.8 1.6 - 8.3 10e3/uL    Absolute Lymphocytes 2.5 0.8 - 5.3 10e3/uL    Absolute Monocytes 0.9 0.0 - 1.3 10e3/uL    Absolute Eosinophils 0.2 0.0 - 0.7 10e3/uL    Absolute Basophils 0.0 0.0 - 0.2 10e3/uL    Absolute Immature Granulocytes 0.1 <=0.4 10e3/uL    Absolute NRBCs 0.0 10e3/uL   Extra Blue Top Tube   Result Value Ref Range    Hold Specimen JIC    Extra Red Top Tube   Result Value Ref Range    Hold Specimen JIC    Extra Green Top (Lithium Heparin) Tube   Result Value Ref Range    Hold Specimen JIC    UA with Microscopic reflex to Culture    Specimen: Urine, Midstream   Result Value Ref Range    Color Urine Straw Colorless, Straw, Light Yellow, Yellow    Appearance Urine Clear Clear    Glucose Urine Negative Negative mg/dL    Bilirubin Urine Negative Negative    Ketones Urine Negative Negative mg/dL    Specific Gravity Urine 1.008  1.003 - 1.035    Blood Urine Negative Negative    pH Urine 6.0 4.7 - 8.0    Protein Albumin Urine Negative Negative mg/dL    Urobilinogen Urine Normal Normal, 2.0 mg/dL    Nitrite Urine Negative Negative    Leukocyte Esterase Urine Negative Negative    RBC Urine 0 <=2 /HPF    WBC Urine <1 <=5 /HPF    Squamous Epithelials Urine 0 <=1 /HPF    Narrative    Urine Culture not indicated   CT Abdomen Pelvis w Contrast    Narrative    EXAMINATION: CT ABDOMEN PELVIS W CONTRAST, 12/26/2023 7:23 PM    TECHNIQUE:  Helical CT images from the lung bases through the  symphysis pubis were obtained  with IV contrast. Contrast dose: Isovue  370 125 mL    COMPARISON: none    HISTORY: Left lower quadrant abdominal pain    FINDINGS:    There is dependent atelectasis at the lung bases.    The liver is free of masses or biliary ductal enlargement. No  calcified gallstones are seen.    The the spleen and pancreas appear normal.    The adrenal glands are normal.    The right and left kidneys are free of masses or hydronephrosis.    The periaortic lymph nodes are normal in caliber.    There is some edema seen adjacent to the descending colon consistent  with diverticulitis. No abscess is seen. There is no extraluminal gas.    The appendix is normal.    In the pelvis the bladder and rectum appear normal.    Degenerative changes are present in the thoracic and lumbar spine      Impression    IMPRESSION: Diverticulitis in the distal descending colon. There is no  abscess.     MELISSA WALL MD         SYSTEM ID:  [id number]       Medications   iopamidol (ISOVUE-370) solution 125 mL (125 mLs Intravenous $Given 12/26/23 1921)   sodium chloride (PF) 0.9% PF flush 50 mL (50 mLs Intravenous $Given 12/26/23 1922)       Assessments & Plan (with Medical Decision Making)   28-year-old male with diverticulitis and no concerning findings of abscess or free air.  Advised to restart his Augmentin.  Close clinic follow-up.  Return here for any new or  worsening symptoms.  At this time there is no reasonable or compelling medical indication for further emergency department evaluation.    This document was prepared using a combination of typing and voice generated software.  While every attempt was made for accuracy, spelling and grammatical errors may exist.     I have reviewed the nursing notes.    I have reviewed the findings, diagnosis, plan and need for follow up with the patient.           Medical Decision Making  The patient's presentation was of low complexity (an acute and uncomplicated illness or injury).    The patient's evaluation involved:  ordering and/or review of 3+ test(s) in this encounter (labs and imaging)    The patient's management necessitated only low risk treatment.        New Prescriptions    No medications on file       Final diagnoses:   Diverticulitis       12/26/2023   [facility]       Deandra Barajas PA-C  12/26/23 1955

## 2024-01-30 ENCOUNTER — OFFICE VISIT (OUTPATIENT)
Dept: OTOLARYNGOLOGY | Facility: OTHER | Age: 29
End: 2024-01-30
Attending: PHYSICIAN ASSISTANT
Payer: COMMERCIAL

## 2024-01-30 VITALS
TEMPERATURE: 97.2 F | RESPIRATION RATE: 20 BRPM | OXYGEN SATURATION: 95 % | BODY MASS INDEX: 41.75 KG/M2 | WEIGHT: 315 LBS | HEART RATE: 58 BPM | SYSTOLIC BLOOD PRESSURE: 138 MMHG | HEIGHT: 73 IN | DIASTOLIC BLOOD PRESSURE: 85 MMHG

## 2024-01-30 DIAGNOSIS — J35.8 TONSIL STONE: ICD-10-CM

## 2024-01-30 DIAGNOSIS — G47.30 SLEEP-RELATED BREATHING DISORDER: ICD-10-CM

## 2024-01-30 DIAGNOSIS — J34.3 NASAL TURBINATE HYPERTROPHY: ICD-10-CM

## 2024-01-30 DIAGNOSIS — J35.1 TONSILLAR HYPERTROPHY: Primary | ICD-10-CM

## 2024-01-30 PROCEDURE — G0463 HOSPITAL OUTPT CLINIC VISIT: HCPCS | Mod: 25

## 2024-01-30 PROCEDURE — 31575 DIAGNOSTIC LARYNGOSCOPY: CPT | Performed by: PHYSICIAN ASSISTANT

## 2024-01-30 PROCEDURE — 99213 OFFICE O/P EST LOW 20 MIN: CPT | Mod: 25 | Performed by: PHYSICIAN ASSISTANT

## 2024-01-30 RX ORDER — FLUTICASONE PROPIONATE 50 MCG
2 SPRAY, SUSPENSION (ML) NASAL DAILY
Qty: 16 G | Refills: 11 | Status: SHIPPED | OUTPATIENT
Start: 2024-01-30

## 2024-01-30 ASSESSMENT — PAIN SCALES - GENERAL: PAINLEVEL: NO PAIN (0)

## 2024-01-30 NOTE — LETTER
2024         RE: Edgar Garrett  [address] [facility] [address]        Dear Colleague,    Thank you for referring your patient, Edgar Garrett, to the [facility]. Please see a copy of my visit note below.        Otolaryngology Consultation    Patient: dEgar Garrett  : 1995    Patient presents with:  Pharyngitis: Tonsillitis.  Referred by, Shannon Naylor NP      HPI:  Edgar Garrett is a 28 year old male seen today for tonsil concerns. He was seen on 23 for sore throat and treated with Augmentin.   Patient presents for tonsil stones. He reports they are quite bothersome and has tried multiple remedies without relief. He did have strep once and had tonsillar bleeding.   Tonsil stones forever. He did have a salivary gland stone and and dilated the duct.     He is able to clear the stones partially, but not able to clear fully.   Sleeping is fairly well.   He does wake up at times and feels   un refreshed.   He does snore at night. No thania witnessed apnea.     Patient denies sore throat or throat pain  Denies chronic otalgia.   Denies fevers, night sweats or weight loss.   Denies dysphagia or dysphonia.   Denies globus sensation.   Allergies- Summer/ fall.       Water- adequate.   Caffeine- pops.   ETOH- Rare  Tobacco- None  Reflux- Yes. He does take medication with good control.   Hx of HTN, obesity.   HTN is fairly well controlled.       Current Outpatient Rx   Medication Sig Dispense Refill     CALCIUM-MAGNESIUM-ZINC PO Take 1 tablet by mouth daily       ClonazePAM (KLONOPIN PO) Take 1 mg by mouth as needed        DiphenhydrAMINE HCl (BENADRYL ALLERGY PO) Take 1 tablet by mouth daily as needed. Pt took one tab today hoping it would help his chest pain       IBUPROFEN PO Take 400 mg by mouth       lisinopril (PRINIVIL/ZESTRIL) 10 MG tablet Take 10 mg by mouth daily       Multiple Vitamin (MULTIVITAMIN ADULT) TABS Take 1 tablet by mouth daily       multivitamin  "w/minerals (MULTI-VITAMIN) tablet Take 1 tablet by mouth daily       Omeprazole (PRILOSEC PO) Take 40 mg by mouth as needed       omeprazole (PRILOSEC) 20 MG DR capsule TAKE ONE (1) CAPSULE BY MOUTH DAILY TAKE BEFORE A MEAL DO NOT CRUSH.       propranolol (INDERAL) 20 MG tablet Take 20 mg by mouth       Vitamin D3 (CHOLECALCIFEROL) 25 mcg (1000 units) tablet Take 1 tablet by mouth daily         Allergies: Patient has no known allergies.     No past medical history on file.    No past surgical history on file.    ENT family history reviewed    Social History     Tobacco Use     Smoking status: Never   Substance Use Topics     Drug use: No       Review of Systems  ROS: 10 point ROS neg other than the symptoms noted above in the HPI     Physical Exam  /85 (BP Location: Left arm, Patient Position: Sitting, Cuff Size: Adult Large)   Pulse 58   Temp 97.2  F (36.2  C) (Temporal)   Resp 20   Ht 1.854 m (6' 1\")   Wt 149.7 kg (330 lb 0.5 oz)   SpO2 95%   BMI 43.54 kg/m      General - The patient is well nourished and well developed, and appears to have good nutritional status.  Alert and oriented to person and place, answers questions and cooperates with examination appropriately.   Head and Face - Normocephalic and atraumatic, with no gross asymmetry noted.  The facial nerve is intact, with strong symmetric movements.  Voice and Breathing - The patient was breathing comfortably without the use of accessory muscles. There was no wheezing, stridor, or stertor.  The patients voice was clear and strong, and had appropriate pitch and quality.  Ears -The external auditory canals are patent, the tympanic membranes are intact without effusion, retraction or mass.  Bony landmarks are intact.  Eyes - Extraocular movements intact, and the pupils were reactive to light.  Sclera were not icteric or injected, conjunctiva were pink and moist.  Mouth - Examination of the oral cavity showed pink, healthy oral mucosa. No lesions " or ulcerations noted.  The tongue was mobile and midline, and the dentition were in good condition.    Throat - The walls of the oropharynx were smooth, pink, moist, symmetric, and had no lesions or ulcerations.  The tonsillar pillars and soft palate were symmetric.  Tonsils grade 4, crowded. FTP IIb The uvula was midline on elevation.    Neck - Normal midline excursion of the laryngotracheal complex during swallowing.  Full range of motion on passive movement.  Palpation of the occipital, submental, submandibular, internal jugular chain, and supraclavicular nodes did not demonstrate any abnormal lymph nodes or masses.  Palpation of the thyroid was soft and smooth, with no nodules or goiter appreciated.  The trachea was mobile and midline.  Nose - External contour is symmetric, no gross deflection or scars.  Nasal mucosa is pink and moist with no abnormal mucus.     Flexible Endoscopy -     The patient was counseled that their symptoms and history require a direct visualization with an endoscopy procedure.  They understood and we proceeded with a fiberoptic examination.  First I sprayed both sides of the nose with a mixture of lidocaine and neosynephrine.  I then passed the scope through the nasal cavity.   The nasal cavity was bilateral severe hypertrophy. The nasopharynx was mucosally covered and symmetric.  The Eustachian tube openings were unobstructed.  Going further down I had a clear view of the base of tongue which had normal appearing lingual tonsillar tissue. [place] tonsils are large and obstruction.   The base of tongue was free of lesions, and the vallecula was open.  The epiglottis was smooth and mucosally covered.  The supraglottic larynx was then clearly visualized.  The vocal cords moved smoothly and symmetrically, they were pearly white and no lesions were seen.  The pyriform sinuses were open, and the limited view of the postcricoid region did not show any lesions.      Impression and Plan- Edgar  NICOL Garrett is a 28 year old male with:        ICD-10-CM    1. Tonsillar hypertrophy  J35.1       2. Tonsil stone  J35.8       3. Nasal turbinate hypertrophy  J34.3 fluticasone (FLONASE) 50 MCG/ACT nasal spray     Adult Sleep Eval & Management  Referral      4. Sleep-related breathing disorder  G47.30         Start nasal rinses as needed.  Recommended 1-2 times daily.  Continue with rinses of tonsil stones.    He does have significant tonsillar hypertrophy upon examination with symptoms and obstructive sleep apnea.  Recommended sleep study.    Following completion of sleep study follow-up with Dr. Noonan to review.  Briefly discussed indications for tonsillectomy and nasal turbinate reduction    Start Flonase 2 sprays each nostril daily        Sarah Overton PA-C  ENT  [facility], [place]      Again, thank you for allowing me to participate in the care of your patient.        Sincerely,        Sarah Overton PA-C

## 2024-01-30 NOTE — PROGRESS NOTES
Otolaryngology Consultation    Patient: Edgar Garrett  : 1995    Patient presents with:  Pharyngitis: Tonsillitis.  Referred by, Shannon Naylor NP      HPI:  Edgar Garrett is a 28 year old male seen today for tonsil concerns. He was seen on 23 for sore throat and treated with Augmentin.   Patient presents for tonsil stones. He reports they are quite bothersome and has tried multiple remedies without relief. He did have strep once and had tonsillar bleeding.   Tonsil stones forever. He did have a salivary gland stone and and dilated the duct.     He is able to clear the stones partially, but not able to clear fully.   Sleeping is fairly well.   He does wake up at times and feels   un refreshed.   He does snore at night. No thania witnessed apnea.     Patient denies sore throat or throat pain  Denies chronic otalgia.   Denies fevers, night sweats or weight loss.   Denies dysphagia or dysphonia.   Denies globus sensation.   Allergies- Summer/ fall.       Water- adequate.   Caffeine- pops.   ETOH- Rare  Tobacco- None  Reflux- Yes. He does take medication with good control.   Hx of HTN, obesity.   HTN is fairly well controlled.       Current Outpatient Rx   Medication Sig Dispense Refill    CALCIUM-MAGNESIUM-ZINC PO Take 1 tablet by mouth daily      ClonazePAM (KLONOPIN PO) Take 1 mg by mouth as needed       DiphenhydrAMINE HCl (BENADRYL ALLERGY PO) Take 1 tablet by mouth daily as needed. Pt took one tab today hoping it would help his chest pain      IBUPROFEN PO Take 400 mg by mouth      lisinopril (PRINIVIL/ZESTRIL) 10 MG tablet Take 10 mg by mouth daily      Multiple Vitamin (MULTIVITAMIN ADULT) TABS Take 1 tablet by mouth daily      multivitamin w/minerals (MULTI-VITAMIN) tablet Take 1 tablet by mouth daily      Omeprazole (PRILOSEC PO) Take 40 mg by mouth as needed      omeprazole (PRILOSEC) 20 MG DR capsule TAKE ONE (1) CAPSULE BY MOUTH DAILY TAKE BEFORE A MEAL DO NOT CRUSH.      propranolol  "(INDERAL) 20 MG tablet Take 20 mg by mouth      Vitamin D3 (CHOLECALCIFEROL) 25 mcg (1000 units) tablet Take 1 tablet by mouth daily         Allergies: Patient has no known allergies.     No past medical history on file.    No past surgical history on file.    ENT family history reviewed    Social History     Tobacco Use    Smoking status: Never   Substance Use Topics    Drug use: No       Review of Systems  ROS: 10 point ROS neg other than the symptoms noted above in the HPI     Physical Exam  /85 (BP Location: Left arm, Patient Position: Sitting, Cuff Size: Adult Large)   Pulse 58   Temp 97.2  F (36.2  C) (Temporal)   Resp 20   Ht 1.854 m (6' 1\")   Wt 149.7 kg (330 lb 0.5 oz)   SpO2 95%   BMI 43.54 kg/m      General - The patient is well nourished and well developed, and appears to have good nutritional status.  Alert and oriented to person and place, answers questions and cooperates with examination appropriately.   Head and Face - Normocephalic and atraumatic, with no gross asymmetry noted.  The facial nerve is intact, with strong symmetric movements.  Voice and Breathing - The patient was breathing comfortably without the use of accessory muscles. There was no wheezing, stridor, or stertor.  The patients voice was clear and strong, and had appropriate pitch and quality.  Ears -The external auditory canals are patent, the tympanic membranes are intact without effusion, retraction or mass.  Bony landmarks are intact.  Eyes - Extraocular movements intact, and the pupils were reactive to light.  Sclera were not icteric or injected, conjunctiva were pink and moist.  Mouth - Examination of the oral cavity showed pink, healthy oral mucosa. No lesions or ulcerations noted.  The tongue was mobile and midline, and the dentition were in good condition.    Throat - The walls of the oropharynx were smooth, pink, moist, symmetric, and had no lesions or ulcerations.  The tonsillar pillars and soft palate were " symmetric.  Tonsils grade 4, crowded. FTP IIb The uvula was midline on elevation.    Neck - Normal midline excursion of the laryngotracheal complex during swallowing.  Full range of motion on passive movement.  Palpation of the occipital, submental, submandibular, internal jugular chain, and supraclavicular nodes did not demonstrate any abnormal lymph nodes or masses.  Palpation of the thyroid was soft and smooth, with no nodules or goiter appreciated.  The trachea was mobile and midline.  Nose - External contour is symmetric, no gross deflection or scars.  Nasal mucosa is pink and moist with no abnormal mucus.     Flexible Endoscopy -     The patient was counseled that their symptoms and history require a direct visualization with an endoscopy procedure.  They understood and we proceeded with a fiberoptic examination.  First I sprayed both sides of the nose with a mixture of lidocaine and neosynephrine.  I then passed the scope through the nasal cavity.   The nasal cavity was bilateral severe hypertrophy. The nasopharynx was mucosally covered and symmetric.  The Eustachian tube openings were unobstructed.  Going further down I had a clear view of the base of tongue which had normal appearing lingual tonsillar tissue. [place] tonsils are large and obstruction.   The base of tongue was free of lesions, and the vallecula was open.  The epiglottis was smooth and mucosally covered.  The supraglottic larynx was then clearly visualized.  The vocal cords moved smoothly and symmetrically, they were pearly white and no lesions were seen.  The pyriform sinuses were open, and the limited view of the postcricoid region did not show any lesions.      Impression and Plan- Edgar Garrett is a 28 year old male with:        ICD-10-CM    1. Tonsillar hypertrophy  J35.1       2. Tonsil stone  J35.8       3. Nasal turbinate hypertrophy  J34.3 fluticasone (FLONASE) 50 MCG/ACT nasal spray     Adult Sleep Eval & Management   Referral      4. Sleep-related breathing disorder  G47.30         Start nasal rinses as needed.  Recommended 1-2 times daily.  Continue with rinses of tonsil stones.    He does have significant tonsillar hypertrophy upon examination with symptoms and obstructive sleep apnea.  Recommended sleep study.    Following completion of sleep study follow-up with Dr. Noonan to review.  Briefly discussed indications for tonsillectomy and nasal turbinate reduction    Start Flonase 2 sprays each nostril daily        Sarah Overton PA-C  ENT  [facility], [place]

## 2024-02-21 ENCOUNTER — DOCUMENTATION ONLY (OUTPATIENT)
Dept: SLEEP MEDICINE | Facility: HOSPITAL | Age: 29
End: 2024-02-21

## 2024-02-22 NOTE — PROGRESS NOTES
SLEEP HISTORY QUESTIONNAIRE    Please describe the main reason for your sleep appointment? Sleep issues due to size of tonsils and nasal passage issues    How long has this been a problem? forever    Have you been diagnosed with a sleep problem in the past? NO    If so, what?     What treatment was recommended?     Have you had a sleep study in the past? NO    If yes, where and when?     Sleep Habits:   Do you read in bed? Yes  Do you eat in bed? No  Do you watch TV in bed? Yes  Do you work in bed? No  Do you use a phone or computer in bed? Yes    Is you sleep disturbed by:   Bed partner: No  Children: Yes  Noise: Yes   Pets: Yes  Other:       On two or more nights per week, do you drink alcohol to help you fall asleep?NO    On two or more nights per week, do you take melatonin to help you fall asleep? YES    On two or more nights per week, do you take over the counter medicine to fall asleep?  YES    Do you take drinks with caffeine (coffee, tea, soda, energy drinks)? YES    Do you have 3 or more caffeine drinks in a day? NO    Do you have caffeine drinks within 6 hours of bedtime? NO    Do you smoke or use tobacco? NO    Do you exercise? YES    Sleep Routine:   Using a 24 Hour Clock    What time do you usually get into bed on workdays? 1am    Weekend/non work days? 2am    What time do you get out of bed on workdays? 9-10am      Weekend/non work days?12pm    Do you work the evening or night shift or do your shifts rotate?     How long does it usually take to fall to sleep? 30 min    How many times do you wake during the night? 1    How much time do you feel that you are awake during the entire night? 2 hours    How long does it take for you to fall back to sleep after you wake up? 10 min    Why do you think you wake up? Drink water or use bathroom    What do you do when you wake up? Use bathroom for a bit    How much sleep do you think you get on work nights? 6-7    How much sleep do you think you get on  weekends/non work days? 8-9    How much sleep do you think you need to feel your best? 10    How many days during a week do you take a nap on average? 0    What is the average length of your naps?     Do you feel better after taking a nap? YES    If you could chose the best sleep schedule for you, what time would you go to bed? 1am  What time would you get up? 11am    Do you read in bed? YES    Do you eat in bed? NO    Do you watch TV in bed? YES    Do you do work in bed? NO    Do you use a computer or phone in bed? YES    Sleep Disruptions?   Leg movements:  Do you ever have restless, crawling, aching or other unusual feelings in your legs? YES    Do you ever wake yourself by kicking your legs during the night? NO    Are the sheets and blankets messed up or tossed about when you get up? YES    Night-time behaviors:   Do you have nightmares or night terrors? NO   How often?     Have you had times when you were sleep walking? NO    Have you been seen doing anything unusual while you sleep at nights? NO  What?   How often?     Have you ever hurt yourself or someone else while you were sleeping? NO  Please describe:     Do you clench or grind your teeth during the night? yes    Sleep Apnea (pauses in breathing during sleep):  Do you wake with a headache in the morning? NO  How often?     Does your bed partner, family or friends ever say that you snore? YES  How many nights per week do you snore? mild  Can snoring be heard outside the bedroom? no    Do you ever wake yourself up from snoring, gasping or choking? NO    Have you ever been told that you stop breathing or have pauses in your breathing? NO    Do you wake in the morning with a dry throat or mouth? YES    Do you have trouble breathing through your nose? YES    Do you have problems with heartburn, reflux or a hiatal hernia? YES    Which positions do you usually sleep in? (stomach, back, sides, all) back, sides    Do you use oxygen or any other medical equipment  when you sleep? NO    Do members of your family (related by blood) snore? YES    Have any members of your family been diagnosed with with sleep apnea? NO    Do other members of your family have restless leg? NO    Do other members of your family have sleep walking? NO    Have you ever had an accident, or near accident due to sleepiness while driving? NO    Does your sleepiness affect your work on the job or at school? YES    Do you ever fall asleep by accident while doing a task? NO    Have you had sudden muscle weakness when laughing, angry or surprised? NO    Have you ever been unable to move your body when falling asleep or waking up? NO    Do you ever have trouble  your dreams from real life events? NO  Please describe:     Physical Health: (including illness and injury): During the past 30 days, on how many days was your physical health not good? 30 days     Mental Health: (including stress, depression, and problems with emotions): During the last 30 days, how may days was your mental health not good?  days.     During the past 30 days, on how many days did poor physical or mental health keep you from doing your usual activities? This might be self-care, work, or play? 15/30 days.     Social History:   Marital status:     Who lives in your home with you? kids    Mother (alive or dead)? alive If has , from what?   Father (alive or dead)? alive If has , from what?     Siblings: YES  Have any ? NO  If so, from what?     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    Medical Conditions: No  past medical history on file.    Medications:   Current Outpatient Medications   Medication Sig    CALCIUM-MAGNESIUM-ZINC PO Take 1 tablet by mouth daily    ClonazePAM (KLONOPIN PO) Take 1 mg by mouth as needed     DiphenhydrAMINE HCl (BENADRYL ALLERGY PO) Take 1 tablet by mouth daily as needed. Pt took one tab today hoping it would help his chest pain    fluticasone (FLONASE) 50 MCG/ACT nasal spray Spray 2 sprays into both nostrils daily    IBUPROFEN PO Take 400 mg by mouth    lisinopril (PRINIVIL/ZESTRIL) 10 MG tablet Take 10 mg by mouth daily    Multiple Vitamin (MULTIVITAMIN ADULT) TABS Take 1 tablet by mouth daily    multivitamin w/minerals (MULTI-VITAMIN) tablet Take 1 tablet by mouth daily    Omeprazole (PRILOSEC PO) Take 40 mg by mouth as needed    omeprazole (PRILOSEC) 20 MG DR capsule TAKE ONE (1) CAPSULE BY MOUTH DAILY TAKE BEFORE A MEAL DO NOT CRUSH.    propranolol (INDERAL) 20 MG tablet Take 20 mg by mouth    Vitamin D3 (CHOLECALCIFEROL) 25 mcg (1000 units) tablet Take 1 tablet by mouth daily     No current facility-administered medications for this visit.       Are you currently having any of the following symptoms?   General:   Obvious weight gain or loss NO  Fever, chills or sweats NO  Drug allergies: no    Eyes:   Changes in vision NO  Blind spots NO  Double vision NO  Other     Ear, Nose and Throat:   Ear pain NO  Sore throat YES  Sinus pain NO  Post-nasal drip NO  Runny nose NO  Bloody nose NO    Heart:   Rapid or irregular heart beat YES  Chest pain or pressure NO  Out of breath when lying down NO  Swelling in feet or legs NO  High blood pressure YES  Heart disease YES    Nervous system   Headaches NO  Weakness in arms or legs NO  Numbness in arms of legs NO  Other:     Skin  Rashes NO  New moles or skin changes NO  Other     Lungs  Shortness of breath at rest NO  Shortness of breath with activity NO  Dry cough NO  Coughing up mucous or phlegm NO  Coughing up blood NO  Wheezing when breathing  NO    Lymph System  Swollen lymph nodes NO  New lumps or bumps NO  Changes in breasts or discharge NO    Digestive System   Nausea or vomiting NO  Loose or watery stools YES  Hard, dry stools (constipation) NO  Fat or grease in stools YES  Blood in stools NO  Stools are black or bloody NO  Abdominal (belly) pain NO    Urinary Tract   Pain when you urinate (pee) NO  Blood in your urine NO  Urinate (pee) more than normal NO  Irregular periods     Muscles and bones   Muscle pain NO  Joint or bone pain NO  Swollen joints NO  Other     Glands  Increased thirst or urination NO  Diabetes NO  Morning glucose:   Afternoon glucose:     Mental Health  Depression NO  Anxiety YES  Other mental health issues:

## 2024-02-22 NOTE — PROGRESS NOTES
STOP SAURABH       Name: Edgar Garrett MRN# [id number]   Age: 28 year old YOB: 1995     Stop Bang questionnaire completed with a score of >3 to allow for HST     Have you been told you snore loudly (louder than talking or loud enough to be heard through doors)? NO    Do you often feel tired, fatigued, or sleepy during the daytime? YES    Has anyone observed you stop breathing during your sleep? NO    Do you have or are you being treated for high blood pressure? YES    Is your BMI greater than 35? YES    Is your neck size circumference 16 inches or greater? YES    Are you over 50 years old? NO    Stop Bang Score (# of yes): 5

## 2024-02-23 ENCOUNTER — TELEPHONE (OUTPATIENT)
Dept: PULMONOLOGY | Facility: OTHER | Age: 29
End: 2024-02-23

## 2024-02-28 DIAGNOSIS — J03.90 ACUTE TONSILLITIS, UNSPECIFIED ETIOLOGY: Primary | ICD-10-CM

## 2024-02-28 DIAGNOSIS — J34.3 HYPERTROPHY OF NASAL TURBINATES: ICD-10-CM

## 2024-03-05 ENCOUNTER — THERAPY VISIT (OUTPATIENT)
Dept: SLEEP MEDICINE | Facility: HOSPITAL | Age: 29
End: 2024-03-05
Attending: FAMILY MEDICINE
Payer: COMMERCIAL

## 2024-03-05 DIAGNOSIS — J34.3 HYPERTROPHY OF NASAL TURBINATES: ICD-10-CM

## 2024-03-05 DIAGNOSIS — J03.90 ACUTE TONSILLITIS, UNSPECIFIED ETIOLOGY: ICD-10-CM

## 2024-03-05 PROCEDURE — 95810 POLYSOM 6/> YRS 4/> PARAM: CPT | Mod: 26 | Performed by: FAMILY MEDICINE

## 2024-03-05 PROCEDURE — 95810 POLYSOM 6/> YRS 4/> PARAM: CPT

## 2024-03-06 NOTE — PROGRESS NOTES
The patient presents to the sleep center with complaints of sleep disordered breathing. The patient's AHI was 11.5, with the lowest o2 saturation being 85%, and the time below 89% being 4.3 minutes. No sleep aid was taken. A normal sinus rhythm was observed. Minimal limb movements were present during the test. Mild snoring was noted by the tech. All stages of sleep were observed. The total sleep time was 188 minutes, sleep efficiency was 46.9%, and the sleep latency was 89.4 minutes. The patient tolerated the test well.

## 2024-03-10 NOTE — PROCEDURES
"-   SLEEP STUDY INTERPRETATION  DIAGNOSTIC POLYSOMNOGRAPHY REPORT      Patient: IRMA FELIX  YOB: 1995  Study Date: 3/5/2024  MRN: [id number]  Referring Provider: Augustine Amos MD  Ordering Provider: Augustine Amos MD    Indications for Polysomnography: The patient is a 28 year old Male who is 6' 1\" and weighs 330.0 lbs. His BMI is 43.7, [place] sleepiness scale 11 and neck circumference is - cm. Relevant medical history includes HTN, GERD, obesity, PITO. A diagnostic polysomnogram was performed to evaluate for sleep apnea.    Polysomnogram Data: A full night polysomnogram recorded the standard physiologic parameters including EEG, EOG, EMG, ECG, nasal and oral airflow. Respiratory parameters of chest and abdominal movements were recorded with respiratory inductance plethysmography. Oxygen saturation was recorded by pulse oximetry. Hypopnea scoring rule used: 1B 4%.    Sleep Architecture: Delayed sleep onset and REM latency, poor sleep efficiency, no supine REM observed.  Increased arousal index.  The total recording time of the polysomnogram was 400.8 minutes. The total sleep time was 187.0 minutes. Sleep latency was delayed at 89.4 minutes without the use of a sleep aid. REM latency was 154.0 minutes. Arousal index was increased at 26.3 arousals per hour. Sleep efficiency was poor at 46.7%. Wake after sleep onset was 124.0 minutes. The patient spent 17.1% of total sleep time in Stage N1, 33.2% in Stage N2, 24.3% in Stage N3, and 25.4% in REM. Time in REM supine was - minutes.    Respiration: Mild MUSA (AHI 11.6) without sleep-associated hypoxemia.  Potential that severity under-reported given no supine REM observed.  Events ? The polysomnogram revealed a presence of 17 obstructive, - central, and - mixed apneas resulting in an apnea index of 5.5 events per hour. There were 19 obstructive hypopneas and - central hypopneas resulting in an obstructive hypopnea index of 6.1 and central hypopnea " index of - events per hour. The combined apnea/hypopnea index was 11.6 events per hour (central apnea/hypopnea index was - events per hour). The REM AHI was 6.3 events per hour. The supine AHI was 26.4 events per hour. The RERA index was - events per hour.  The RDI was 11.6 events per hour.  Snoring - was reported as intermittent, mild.  Respiratory rate and pattern - was notable for normal respiratory rate and pattern.  Sustained Sleep Associated Hypoventilation - Transcutaneous carbon dioxide monitoring was not used, however significant hypoventilation was not suggested by oximetry.  Sleep Associated Hypoxemia - (Greater than 5 minutes O2 sat at or below 88%) was not present. Baseline oxygen saturation was 93.5%. Lowest oxygen saturation was 85.0%. Time spent less than or equal to 88% was 2.8 minutes. Time spent less than or equal to 89% was 4.3 minutes.    Movement Activity: Nothing of note  Periodic Limb Activity - There were - PLMs during the entire study. The PLM index was - movements per hour. The PLM Arousal Index was - per hour.  REM EMG Activity - Excessive transient/sustained muscle activity was not present.  Nocturnal Behavior - Abnormal sleep related behaviors were not noted during/arising out of NREM / REM sleep  Bruxism - None apparent.    Cardiac Summary: Appears NSR  The average pulse rate was 62.4 bpm. The minimum pulse rate was 44.0 bpm while the maximum pulse rate was 124.0 bpm.      Assessment:   Delayed sleep onset and REM latency, poor sleep efficiency, no supine REM observed.  Increased arousal index.  Mild MUSA (AHI 11.6) without sleep-associated hypoxemia.  Potential that severity under-reported given no supine REM observed.        Recommendations:  Consider repeat polysomnography with full night titration of positive airway pressure therapy for the control of sleep disordered breathing.  Based on the presence of mild obstructive sleep apnea and excessive daytime sleepiness, treatment could  be empirically initiated with Auto?titrating PAP therapy with a range of 5 to 15 cmH2O. Recommend clinical follow up with sleep management team.  Patient may be a candidate for dental appliance through referral to Sleep Dentistry for the treatment of obstructive sleep apnea and/or socially disruptive snoring.  If devices are not acceptable or effective, patient may benefit from evaluation of possible surgical options. If he is interested, would recommend referral to specialized ENT-Sleep provider.  Advice regarding the risks of drowsy driving.  Suggest optimizing sleep schedule and avoiding sleep deprivation.  Weight management (if BMI > 30).    Diagnostic Codes:   Obstructive Sleep Apnea G47.33     _____________________________________   Electronically Signed By: Augustine Amos MD (3/10/2024)

## 2024-03-20 NOTE — PROGRESS NOTES
"Virtual Visit Details    Type of service:  Video Visit     Edgar Garrett is a 28 year old male who is being evaluated via a billable video visit.       The patient has been notified of following:      \"This video visit will be conducted via a call between you and your physician/provider. We have found that certain health care needs can be provided without the need for an in-person physical exam.  This service lets us provide the care you need with a video conversation.  If a prescription is necessary we can send it directly to your pharmacy.  If lab work is needed we can place an order for that and you can then stop by our lab to have the test done at a later time.     Video visits are billed at different rates depending on your insurance coverage.  Please reach out to your insurance provider with any questions.     If during the course of the call the physician/provider feels a video visit is not appropriate, you will not be charged for this service.\"     Patient has given verbal consent for Video visit? Yes  How would you like to obtain your AVS? Mail a copy  If you are dropped from the video visit, the video invite should be resent to: Text to cell phone: -  Will anyone else be joining your video visit? No  If patient encounters technical issues they should call [phone]      Video-Visit Details     Type of service:  Video Visit     Start Time:  4pm  End Time: 4:23 PM    Originating Location (pt. Location): Home     Distant Location (provider location):  Off-site, [facility]       Platform used for Video Visit: [employer]    Virtual visit for review of sleep testing results.     A/P:     1.)  Mild MUSA (AHI 11.6) without sleep-associated hypoxemia.   - Potential that severity under-reported given no supine REM observed     We discussed that this level of obstructive sleep apnea is typically not felt to have a significant increase in long-term cardiovascular risk factors, but treatment " "may have benefit for mood disorders and for hypertension.    It would seem very reasonable to start with tonsillectomy as recommended by ENT and that may be curative for his mild obstructive sleep apnea.    Our neck step will be follow-up with ENT for consideration for tonsillectomy.  I would recommend clinical follow-up 6-8 weeks after surgery to consider if there would be indication for repeat sleep testing.    SUBJECTIVE:  Edgar Garrett is a 28 year old male.    28 year old yo male who is referred for sleep-disordered breathing.     Pertinent PMHx of HTN, GERD, obesity, PITO.     Reviewed ENT consult with Sarah Overton on 1/30/2024 with significant tonsil hypertrophy, risks for MUSA.  Plan for sleep testing, consider tonsillectomy and nasal turbinate reduction.     STOP-BANG score of 5, with unknown neck circumference.  [place] score of 11.  BMI of Estimated body mass index is 43.54 kg/m  as calculated from the following:    Height as of 1/30/24: 1.854 m (6' 1\").    Weight as of 1/30/24: 149.7 kg (330 lb 0.5 oz).      Today -we reviewed his sleep study results in detail he largely denied any specific sleep concerns, he predominately was evaluate for sleep apnea as a step for consideration for tonsillectomy and potential nasal turbinate reduction.  But, he has noted some nights where he seems to sleep adequate number of hours and can still feel tired the next day, so he did wonder if he possibly had some sleep apnea.    Per questionnaire: \"Sleep issues due to size of tonsils and nasal passage issues \"     Caffeine use:  No for 3+ per day.  No for within 6 hours of bed.     Tobacco use: No     Sleep pattern:  Workdays.  1am to 9-10am, total sleep time 6-7 hours.  Weekends.  2am - noon, total sleep time 8-9 hours.  Time to fall asleep: ~30 minutes.  Awakenings: 1 times per night, 10 minutes to return to sleep, awake for total of 2 hours per night.  Napping.  0 days per week, - hours per nap.     Yes for TV, phone in " "bed.     Yes for RLS screen.  No for sleep walking.  No for dream enactment behavior.  Yes for bruxism.     No for morning headaches.  Yes for snoring.  No for observed apnea.  No for FHx of MUSA.     SHx:  , lives with children.  Works as .    SLEEP STUDY INTERPRETATION  DIAGNOSTIC POLYSOMNOGRAPHY REPORT        Patient: IRMA FELIX  YOB: 1995  Study Date: 3/5/2024  MRN: [id number]  Referring Provider: Augustine Amos MD  Ordering Provider: Augustine Amos MD     Indications for Polysomnography: The patient is a 28 year old Male who is 6' 1\" and weighs 330.0 lbs. His BMI is 43.7, [place] sleepiness scale 11 and neck circumference is - cm. Relevant medical history includes HTN, GERD, obesity, PITO. A diagnostic polysomnogram was performed to evaluate for sleep apnea.     Polysomnogram Data: A full night polysomnogram recorded the standard physiologic parameters including EEG, EOG, EMG, ECG, nasal and oral airflow. Respiratory parameters of chest and abdominal movements were recorded with respiratory inductance plethysmography. Oxygen saturation was recorded by pulse oximetry. Hypopnea scoring rule used: 1B 4%.     Sleep Architecture: Delayed sleep onset and REM latency, poor sleep efficiency, no supine REM observed.  Increased arousal index.  The total recording time of the polysomnogram was 400.8 minutes. The total sleep time was 187.0 minutes. Sleep latency was delayed at 89.4 minutes without the use of a sleep aid. REM latency was 154.0 minutes. Arousal index was increased at 26.3 arousals per hour. Sleep efficiency was poor at 46.7%. Wake after sleep onset was 124.0 minutes. The patient spent 17.1% of total sleep time in Stage N1, 33.2% in Stage N2, 24.3% in Stage N3, and 25.4% in REM. Time in REM supine was - minutes.     Respiration: Mild MUSA (AHI 11.6) without sleep-associated hypoxemia.  Potential that severity under-reported given no supine REM observed.  Events ? The " polysomnogram revealed a presence of 17 obstructive, - central, and - mixed apneas resulting in an apnea index of 5.5 events per hour. There were 19 obstructive hypopneas and - central hypopneas resulting in an obstructive hypopnea index of 6.1 and central hypopnea index of - events per hour. The combined apnea/hypopnea index was 11.6 events per hour (central apnea/hypopnea index was - events per hour). The REM AHI was 6.3 events per hour. The supine AHI was 26.4 events per hour. The RERA index was - events per hour.  The RDI was 11.6 events per hour.  Snoring - was reported as intermittent, mild.  Respiratory rate and pattern - was notable for normal respiratory rate and pattern.  Sustained Sleep Associated Hypoventilation - Transcutaneous carbon dioxide monitoring was not used, however significant hypoventilation was not suggested by oximetry.  Sleep Associated Hypoxemia - (Greater than 5 minutes O2 sat at or below 88%) was not present. Baseline oxygen saturation was 93.5%. Lowest oxygen saturation was 85.0%. Time spent less than or equal to 88% was 2.8 minutes. Time spent less than or equal to 89% was 4.3 minutes.     Movement Activity: Nothing of note  Periodic Limb Activity - There were - PLMs during the entire study. The PLM index was - movements per hour. The PLM Arousal Index was - per hour.  REM EMG Activity - Excessive transient/sustained muscle activity was not present.  Nocturnal Behavior - Abnormal sleep related behaviors were not noted during/arising out of NREM / REM sleep  Bruxism - None apparent.     Cardiac Summary: Appears NSR  The average pulse rate was 62.4 bpm. The minimum pulse rate was 44.0 bpm while the maximum pulse rate was 124.0 bpm.       Assessment:   Delayed sleep onset and REM latency, poor sleep efficiency, no supine REM observed.  Increased arousal index.  Mild MUSA (AHI 11.6) without sleep-associated hypoxemia.  Potential that severity under-reported given no supine REM observed.      Recommendations:  Consider repeat polysomnography with full night titration of positive airway pressure therapy for the control of sleep disordered breathing.  Based on the presence of mild obstructive sleep apnea and excessive daytime sleepiness, treatment could be empirically initiated with Auto?titrating PAP therapy with a range of 5 to 15 cmH2O. Recommend clinical follow up with sleep management team.  Patient may be a candidate for dental appliance through referral to Sleep Dentistry for the treatment of obstructive sleep apnea and/or socially disruptive snoring.  If devices are not acceptable or effective, patient may benefit from evaluation of possible surgical options. If he is interested, would recommend referral to specialized ENT-Sleep provider.  Advice regarding the risks of drowsy driving.  Suggest optimizing sleep schedule and avoiding sleep deprivation.  Weight management (if BMI > 30).     Diagnostic Codes:   Obstructive Sleep Apnea G47.33     _____________________________________   Electronically Signed By: Augustine Amos MD (3/10/2024)       Past medical history:    There are no problems to display for this patient.      10 point ROS of systems including Constitutional, Eyes, Respiratory, Cardiovascular, Gastroenterology, Genitourinary, Integumentary, Muscularskeletal, Psychiatric were all negative except for pertinent positives noted in my HPI.    Current Outpatient Medications   Medication Sig Dispense Refill    CALCIUM-MAGNESIUM-ZINC PO Take 1 tablet by mouth daily      ClonazePAM (KLONOPIN PO) Take 1 mg by mouth as needed       DiphenhydrAMINE HCl (BENADRYL ALLERGY PO) Take 1 tablet by mouth daily as needed. Pt took one tab today hoping it would help his chest pain      fluticasone (FLONASE) 50 MCG/ACT nasal spray Spray 2 sprays into both nostrils daily 16 g 11    IBUPROFEN PO Take 400 mg by mouth      lisinopril (PRINIVIL/ZESTRIL) 10 MG tablet Take 10 mg by mouth daily      Multiple  Vitamin (MULTIVITAMIN ADULT) TABS Take 1 tablet by mouth daily      multivitamin w/minerals (MULTI-VITAMIN) tablet Take 1 tablet by mouth daily      Omeprazole (PRILOSEC PO) Take 40 mg by mouth as needed      omeprazole (PRILOSEC) 20 MG DR capsule TAKE ONE (1) CAPSULE BY MOUTH DAILY TAKE BEFORE A MEAL DO NOT CRUSH.      propranolol (INDERAL) 20 MG tablet Take 20 mg by mouth      Vitamin D3 (CHOLECALCIFEROL) 25 mcg (1000 units) tablet Take 1 tablet by mouth daily         OBJECTIVE:  There were no vitals taken for this visit.    Physical Exam     ---  This note was written with the assistance of the Dragon voice-dictation technology software. The final document, although reviewed, may contain errors. For corrections, please contact the office.    Total time spent preparing to see the patient, review of chart, obtaining history and physical examination, review of sleep testing, review of treatment options, education, discussion with patient and documenting in Epic / EMR was 25 minutes.  All time involved was spent on the day of service for the patient (the same day as the patient's appointment).    Augustine Amos MD    Sleep Medicine  [place], [place]  Main Office: [phone]  [facility] [address], [address]  Schedule visits: [phone]  Main Office: [phone]  Fax: [phone]

## 2024-03-21 ENCOUNTER — VIRTUAL VISIT (OUTPATIENT)
Dept: PULMONOLOGY | Facility: OTHER | Age: 29
End: 2024-03-21
Attending: FAMILY MEDICINE
Payer: COMMERCIAL

## 2024-03-21 VITALS — WEIGHT: 315 LBS | HEIGHT: 73 IN | BODY MASS INDEX: 41.75 KG/M2

## 2024-03-21 DIAGNOSIS — J35.1 TONSILLAR HYPERTROPHY: ICD-10-CM

## 2024-03-21 DIAGNOSIS — G47.33 OSA (OBSTRUCTIVE SLEEP APNEA): Primary | ICD-10-CM

## 2024-03-21 PROCEDURE — 99213 OFFICE O/P EST LOW 20 MIN: CPT | Mod: 95 | Performed by: FAMILY MEDICINE

## 2024-03-21 ASSESSMENT — PAIN SCALES - GENERAL: PAINLEVEL: NO PAIN (0)

## 2024-03-21 NOTE — Clinical Note
Jim Smith  Edgar did complete his sleep study and was found to have just mild obstructive sleep apnea without hypoxemia.  He would seem very reasonable for him to proceed with tonsillectomy and I would suspect that this would be beneficial for his mild apnea, I recommended he schedule follow-up with you.  If there is plan to proceed with tonsillectomy, I would be nice to meet back with him 6-8 weeks after the surgery to see if there would be need for follow-up sleep testing.  Augustine Amos MD  Sleep Medicine [place], [place] Main Office: [phone] [facility] [address], [address] Schedule visits: [phone] Main Office: [phone] Fax: [phone]

## 2024-03-21 NOTE — NURSING NOTE
Has patient had flu shot for current/most recent flu season? If so, when? Yes: 3/5/24      Is the patient currently in the state of [place]? YES    Visit mode:VIDEO    If the visit is dropped, the patient can be reconnected by: VIDEO VISIT: Text to cell phone:   Telephone Information:   Mobile [phone]       Will anyone else be joining the visit? NO  (If patient encounters technical issues they should call [phone])    How would you like to obtain your AVS? MyChart    Are changes needed to the allergy or medication list? No    Reason for visit: RECHECK    Dayna JORDAN

## 2024-03-26 ENCOUNTER — TELEPHONE (OUTPATIENT)
Dept: OTOLARYNGOLOGY | Facility: OTHER | Age: 29
End: 2024-03-26

## 2024-07-02 NOTE — PATIENT INSTRUCTIONS
Patient's mom is calling to request refill on Vyvanse. Pharmacy desired attached. Please fill as able.    Start Flonase 2 sprays to each nostril daily.   Complete sleep study.   Once completed follow up with Dr. Noonan.     Use Soda salt gargles for tonsil stones. Rinse/ gargle twice a day      Thank you for allowing Sarah Overton PA-C and our ENT team to participate in your care.  If your medications are too expensive, please give the nurse a call.  We can possibly change this medication.  If you have a scheduling or an appointment question please contact our Health Unit Coordinator at [phone], Ext. [phone].    ALL nursing questions or concerns can be directed to your ENT nurse at: [phone] [place]       Soda/Salt Oral Rinse     1/4 tsp. Baking Soda   1/8 tsp. Salt   1 cup Warm Water     Mix well until salt dissolves. Rinse your mouth gently 2-3 time per day, being careful not to swallow. After, rinse with plain water.

## 2024-09-19 ENCOUNTER — HOSPITAL ENCOUNTER (EMERGENCY)
Facility: HOSPITAL | Age: 29
Discharge: HOME | End: 2024-09-19
Attending: PHYSICIAN ASSISTANT
Payer: COMMERCIAL

## 2024-09-19 VITALS
OXYGEN SATURATION: 96 % | RESPIRATION RATE: 18 BRPM | HEART RATE: 60 BPM | SYSTOLIC BLOOD PRESSURE: 170 MMHG | TEMPERATURE: 97.4 F | DIASTOLIC BLOOD PRESSURE: 90 MMHG

## 2024-09-19 DIAGNOSIS — S00.03XA HEMATOMA OF FRONTAL SCALP, INITIAL ENCOUNTER: ICD-10-CM

## 2024-09-19 PROCEDURE — G0463 HOSPITAL OUTPT CLINIC VISIT: HCPCS | Mod: 25

## 2024-09-19 PROCEDURE — 250N000011 HC RX IP 250 OP 636: Performed by: PHYSICIAN ASSISTANT

## 2024-09-19 PROCEDURE — 99213 OFFICE O/P EST LOW 20 MIN: CPT | Performed by: PHYSICIAN ASSISTANT

## 2024-09-19 PROCEDURE — 90471 IMMUNIZATION ADMIN: CPT | Performed by: PHYSICIAN ASSISTANT

## 2024-09-19 PROCEDURE — 90715 TDAP VACCINE 7 YRS/> IM: CPT | Performed by: PHYSICIAN ASSISTANT

## 2024-09-19 RX ADMIN — CLOSTRIDIUM TETANI TOXOID ANTIGEN (FORMALDEHYDE INACTIVATED), CORYNEBACTERIUM DIPHTHERIAE TOXOID ANTIGEN (FORMALDEHYDE INACTIVATED), BORDETELLA PERTUSSIS TOXOID ANTIGEN (GLUTARALDEHYDE INACTIVATED), BORDETELLA PERTUSSIS FILAMENTOUS HEMAGGLUTININ ANTIGEN (FORMALDEHYDE INACTIVATED), BORDETELLA PERTUSSIS PERTACTIN ANTIGEN, AND BORDETELLA PERTUSSIS FIMBRIAE 2/3 ANTIGEN 0.5 ML: 5; 2; 2.5; 5; 3; 5 INJECTION, SUSPENSION INTRAMUSCULAR at 17:50

## 2024-09-19 ASSESSMENT — COLUMBIA-SUICIDE SEVERITY RATING SCALE - C-SSRS
1. IN THE PAST MONTH, HAVE YOU WISHED YOU WERE DEAD OR WISHED YOU COULD GO TO SLEEP AND NOT WAKE UP?: NO
2. HAVE YOU ACTUALLY HAD ANY THOUGHTS OF KILLING YOURSELF IN THE PAST MONTH?: NO
6. HAVE YOU EVER DONE ANYTHING, STARTED TO DO ANYTHING, OR PREPARED TO DO ANYTHING TO END YOUR LIFE?: NO

## 2024-09-19 ASSESSMENT — ENCOUNTER SYMPTOMS
DIZZINESS: 0
HEADACHES: 1
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
## 2024-09-19 NOTE — ED NOTES
SOO Pearson assessed patient in triage and determined patient Urgent Care appropriate. Will be seen in Urgent Care.

## 2024-09-19 NOTE — DISCHARGE INSTRUCTIONS
Edgar, we reviewed features of this head injury and thankfully it is meeting low-risk criteria.     At this point we agree on observation. If you develop vomiting, vision/hearing changes, severe headache, seizure-like movements or any other significant worsening please call 911.     Otherwise please apply ice to this area for the next 1-2 days.   -Take tylenol 500-1000 mg every 6-8 hours as needed    Thank you!

## 2025-04-04 ENCOUNTER — HOSPITAL ENCOUNTER (EMERGENCY)
Facility: HOSPITAL | Age: 30
Discharge: HOME | End: 2025-04-04
Attending: NURSE PRACTITIONER

## 2025-04-04 ENCOUNTER — APPOINTMENT (OUTPATIENT)
Dept: CT IMAGING | Facility: HOSPITAL | Age: 30
End: 2025-04-04
Attending: NURSE PRACTITIONER

## 2025-04-04 VITALS
SYSTOLIC BLOOD PRESSURE: 141 MMHG | HEART RATE: 98 BPM | OXYGEN SATURATION: 96 % | RESPIRATION RATE: 21 BRPM | DIASTOLIC BLOOD PRESSURE: 98 MMHG | TEMPERATURE: 98.1 F

## 2025-04-04 DIAGNOSIS — R10.32 LLQ ABDOMINAL PAIN: ICD-10-CM

## 2025-04-04 DIAGNOSIS — R19.7 NAUSEA VOMITING AND DIARRHEA: ICD-10-CM

## 2025-04-04 DIAGNOSIS — R11.2 NAUSEA VOMITING AND DIARRHEA: ICD-10-CM

## 2025-04-04 PROBLEM — K21.9 GASTROESOPHAGEAL REFLUX DISEASE: Status: ACTIVE | Noted: 2019-01-11

## 2025-04-04 PROBLEM — F41.1 GENERALIZED ANXIETY DISORDER: Status: ACTIVE | Noted: 2019-01-11

## 2025-04-04 LAB
ALBUMIN SERPL BCG-MCNC: 4.5 G/DL (ref 3.5–5.2)
ALBUMIN UR-MCNC: NEGATIVE MG/DL
ALP SERPL-CCNC: 72 U/L (ref 40–150)
ALT SERPL W P-5'-P-CCNC: 22 U/L (ref 0–70)
ANION GAP SERPL CALCULATED.3IONS-SCNC: 18 MMOL/L (ref 7–15)
APPEARANCE UR: CLEAR
AST SERPL W P-5'-P-CCNC: 20 U/L (ref 0–45)
BILIRUB SERPL-MCNC: 1.1 MG/DL
BILIRUB UR QL STRIP: NEGATIVE
BUN SERPL-MCNC: 17.3 MG/DL (ref 6–20)
CALCIUM SERPL-MCNC: 9.9 MG/DL (ref 8.8–10.4)
CHLORIDE SERPL-SCNC: 100 MMOL/L (ref 98–107)
COLOR UR AUTO: ABNORMAL
CREAT SERPL-MCNC: 0.86 MG/DL (ref 0.67–1.17)
EGFRCR SERPLBLD CKD-EPI 2021: >90 ML/MIN/1.73M2
ERYTHROCYTE [DISTWIDTH] IN BLOOD BY AUTOMATED COUNT: 13 % (ref 10–15)
GLUCOSE SERPL-MCNC: 139 MG/DL (ref 70–99)
GLUCOSE UR STRIP-MCNC: NEGATIVE MG/DL
HCO3 SERPL-SCNC: 20 MMOL/L (ref 22–29)
HCT VFR BLD AUTO: 52.1 % (ref 40–53)
HGB BLD-MCNC: 18.4 G/DL (ref 13.3–17.7)
HGB UR QL STRIP: NEGATIVE
HOLD SPECIMEN: NORMAL
KETONES UR STRIP-MCNC: NEGATIVE MG/DL
LEUKOCYTE ESTERASE UR QL STRIP: NEGATIVE
LIPASE SERPL-CCNC: 15 U/L (ref 13–60)
MCH RBC QN AUTO: 29 PG (ref 26.5–33)
MCHC RBC AUTO-ENTMCNC: 35.3 G/DL (ref 31.5–36.5)
MCV RBC AUTO: 82 FL (ref 78–100)
MUCOUS THREADS #/AREA URNS LPF: PRESENT /LPF
NITRATE UR QL: NEGATIVE
PH UR STRIP: 6.5 [PH] (ref 4.7–8)
PLATELET # BLD AUTO: 195 10E3/UL (ref 150–450)
POTASSIUM SERPL-SCNC: 3.5 MMOL/L (ref 3.4–5.3)
PROT SERPL-MCNC: 8.7 G/DL (ref 6.4–8.3)
RBC # BLD AUTO: 6.35 10E6/UL (ref 4.4–5.9)
RBC URINE: 2 /HPF
SODIUM SERPL-SCNC: 138 MMOL/L (ref 135–145)
SP GR UR STRIP: 1 (ref 1–1.03)
SQUAMOUS EPITHELIAL: 0 /HPF
UROBILINOGEN UR STRIP-MCNC: NORMAL MG/DL
WBC # BLD AUTO: 12.7 10E3/UL (ref 4–11)
WBC URINE: 1 /HPF

## 2025-04-04 PROCEDURE — 99285 EMERGENCY DEPT VISIT HI MDM: CPT | Mod: 25 | Performed by: NURSE PRACTITIONER

## 2025-04-04 PROCEDURE — 99285 EMERGENCY DEPT VISIT HI MDM: CPT | Performed by: NURSE PRACTITIONER

## 2025-04-04 PROCEDURE — 93005 ELECTROCARDIOGRAM TRACING: CPT | Performed by: NURSE PRACTITIONER

## 2025-04-04 PROCEDURE — 96374 THER/PROPH/DIAG INJ IV PUSH: CPT | Mod: XU | Performed by: PHYSICIAN ASSISTANT

## 2025-04-04 PROCEDURE — 85041 AUTOMATED RBC COUNT: CPT | Performed by: NURSE PRACTITIONER

## 2025-04-04 PROCEDURE — 96375 TX/PRO/DX INJ NEW DRUG ADDON: CPT | Performed by: PHYSICIAN ASSISTANT

## 2025-04-04 PROCEDURE — 96375 TX/PRO/DX INJ NEW DRUG ADDON: CPT | Performed by: NURSE PRACTITIONER

## 2025-04-04 PROCEDURE — 250N000011 HC RX IP 250 OP 636: Performed by: NURSE PRACTITIONER

## 2025-04-04 PROCEDURE — 36415 COLL VENOUS BLD VENIPUNCTURE: CPT | Performed by: NURSE PRACTITIONER

## 2025-04-04 PROCEDURE — 93005 ELECTROCARDIOGRAM TRACING: CPT | Performed by: PHYSICIAN ASSISTANT

## 2025-04-04 PROCEDURE — 258N000003 HC RX IP 258 OP 636: Performed by: NURSE PRACTITIONER

## 2025-04-04 PROCEDURE — 96361 HYDRATE IV INFUSION ADD-ON: CPT | Performed by: PHYSICIAN ASSISTANT

## 2025-04-04 PROCEDURE — 81001 URINALYSIS AUTO W/SCOPE: CPT | Performed by: NURSE PRACTITIONER

## 2025-04-04 PROCEDURE — 74177 CT ABD & PELVIS W/CONTRAST: CPT | Mod: 26 | Performed by: RADIOLOGY

## 2025-04-04 PROCEDURE — 93010 ELECTROCARDIOGRAM REPORT: CPT | Performed by: INTERNAL MEDICINE

## 2025-04-04 PROCEDURE — 99285 EMERGENCY DEPT VISIT HI MDM: CPT | Mod: 25 | Performed by: PHYSICIAN ASSISTANT

## 2025-04-04 PROCEDURE — 96374 THER/PROPH/DIAG INJ IV PUSH: CPT | Mod: XU | Performed by: NURSE PRACTITIONER

## 2025-04-04 PROCEDURE — 83690 ASSAY OF LIPASE: CPT | Performed by: NURSE PRACTITIONER

## 2025-04-04 PROCEDURE — 250N000011 HC RX IP 250 OP 636: Mod: JZ | Performed by: NURSE PRACTITIONER

## 2025-04-04 PROCEDURE — 96361 HYDRATE IV INFUSION ADD-ON: CPT | Performed by: NURSE PRACTITIONER

## 2025-04-04 PROCEDURE — 80053 COMPREHEN METABOLIC PANEL: CPT | Performed by: NURSE PRACTITIONER

## 2025-04-04 PROCEDURE — 74177 CT ABD & PELVIS W/CONTRAST: CPT

## 2025-04-04 RX ORDER — IOPAMIDOL 755 MG/ML
150 INJECTION, SOLUTION INTRAVASCULAR ONCE
Status: COMPLETED | OUTPATIENT
Start: 2025-04-04 | End: 2025-04-04

## 2025-04-04 RX ORDER — HYDROMORPHONE HYDROCHLORIDE 1 MG/ML
0.5 INJECTION, SOLUTION INTRAMUSCULAR; INTRAVENOUS; SUBCUTANEOUS
Status: COMPLETED | OUTPATIENT
Start: 2025-04-04 | End: 2025-04-04

## 2025-04-04 RX ORDER — DIPHENHYDRAMINE HYDROCHLORIDE 50 MG/ML
50 INJECTION, SOLUTION INTRAMUSCULAR; INTRAVENOUS ONCE
Status: COMPLETED | OUTPATIENT
Start: 2025-04-04 | End: 2025-04-04

## 2025-04-04 RX ORDER — ONDANSETRON 4 MG/1
4-8 TABLET, ORALLY DISINTEGRATING ORAL EVERY 8 HOURS PRN
Qty: 10 TABLET | Refills: 0 | Status: SHIPPED | OUTPATIENT
Start: 2025-04-04

## 2025-04-04 RX ADMIN — SODIUM CHLORIDE, SODIUM LACTATE, POTASSIUM CHLORIDE, AND CALCIUM CHLORIDE 1000 ML: .6; .31; .03; .02 INJECTION, SOLUTION INTRAVENOUS at 18:18

## 2025-04-04 RX ADMIN — HYDROMORPHONE HYDROCHLORIDE 0.5 MG: 1 INJECTION, SOLUTION INTRAMUSCULAR; INTRAVENOUS; SUBCUTANEOUS at 18:26

## 2025-04-04 RX ADMIN — IOPAMIDOL 150 ML: 755 INJECTION, SOLUTION INTRAVENOUS at 18:52

## 2025-04-04 RX ADMIN — DIPHENHYDRAMINE HYDROCHLORIDE 50 MG: 50 INJECTION, SOLUTION INTRAMUSCULAR; INTRAVENOUS at 18:18

## 2025-04-04 ASSESSMENT — ACTIVITIES OF DAILY LIVING (ADL)
ADLS_ACUITY_SCORE: 41
ADLS_ACUITY_SCORE: 41

## 2025-04-04 ASSESSMENT — ENCOUNTER SYMPTOMS
BLOOD IN STOOL: 0
NAUSEA: 1
CONSTITUTIONAL NEGATIVE: 1
EYES NEGATIVE: 1
CARDIOVASCULAR NEGATIVE: 1
RESPIRATORY NEGATIVE: 1
NERVOUS/ANXIOUS: 1
CONSTIPATION: 0
DIARRHEA: 1
ENDOCRINE NEGATIVE: 1
ALLERGIC/IMMUNOLOGIC NEGATIVE: 1
HEMATOLOGIC/LYMPHATIC NEGATIVE: 1
VOMITING: 1
MUSCULOSKELETAL NEGATIVE: 1
NEUROLOGICAL NEGATIVE: 1
ABDOMINAL PAIN: 1

## 2025-04-04 ASSESSMENT — COLUMBIA-SUICIDE SEVERITY RATING SCALE - C-SSRS
2. HAVE YOU ACTUALLY HAD ANY THOUGHTS OF KILLING YOURSELF IN THE PAST MONTH?: NO
1. IN THE PAST MONTH, HAVE YOU WISHED YOU WERE DEAD OR WISHED YOU COULD GO TO SLEEP AND NOT WAKE UP?: NO
6. HAVE YOU EVER DONE ANYTHING, STARTED TO DO ANYTHING, OR PREPARED TO DO ANYTHING TO END YOUR LIFE?: NO

## 2025-04-04 NOTE — ED TRIAGE NOTES
Pt reports that he might be having a diverticulitis flare, pt has been vomiting and having diarrhea today. Pt states he also becomes hot, sweaty when he vomits or after. Pt also feels a racing heart.

## 2025-04-04 NOTE — ED PROVIDER NOTES
History     Chief Complaint   Patient presents with    Nausea, Vomiting, & Diarrhea     HPI  Edgar Garrett is a 29 year old individual with history of hypertension, GERD, generalized anxiety disorder, comes in for abdominal pain with nausea and vomiting.  Patient states that after eating chicken wings last night started develop some left lower quadrant abdominal pain.  Has been having some nausea, vomiting, and loose stool since.  Patient states is getting dizzy with the vomiting.  Due to the pain patient comes in.  No blood in emesis or stools reported.  No dysuria or hematuria.  States has history of diverticulitis but he has never had this bad of symptoms.    Allergies:  No Known Allergies    Problem List:    Patient Active Problem List    Diagnosis Date Noted    MUSA (obstructive sleep apnea) - Mild 03/21/2024     Priority: Medium     Mild MUSA (AHI 11.6) without sleep-associated hypoxemia.   - Potential that severity under-reported given no supine REM observed     PSG on 3/5/2024 with weight 337 pounds, BMI 43.7.  Mild MUSA (AHI 11.6) without sleep-associated hypoxemia.  Potential that severity under-reported given no supine REM observed.  No PLM's.  EKG normal sinus rhythm.      Gastroesophageal reflux disease 01/11/2019     Priority: Medium    Generalized anxiety disorder 01/11/2019     Priority: Medium    Essential hypertension with goal blood pressure less than 140/90 06/20/2016     Priority: Medium        Past Medical History:    No past medical history on file.    Past Surgical History:    No past surgical history on file.    Family History:    No family history on file.    Social History:  Marital Status:  Single [1]  Social History     Tobacco Use    Smoking status: Never     Passive exposure: Past   Vaping Use    Vaping status: Some Days   Substance Use Topics    Drug use: No        Medications:    lisinopril (PRINIVIL/ZESTRIL) 10 MG tablet  omeprazole (PRILOSEC) 20 MG DR capsule  propranolol  (INDERAL) 20 MG tablet  CALCIUM-MAGNESIUM-ZINC PO  ClonazePAM (KLONOPIN PO)  DiphenhydrAMINE HCl (BENADRYL ALLERGY PO)  fluticasone (FLONASE) 50 MCG/ACT nasal spray  IBUPROFEN PO  Multiple Vitamin (MULTIVITAMIN ADULT) TABS  multivitamin w/minerals (MULTI-VITAMIN) tablet  Omeprazole (PRILOSEC PO)  ondansetron (ZOFRAN ODT) 4 MG ODT tab  Vitamin D3 (CHOLECALCIFEROL) 25 mcg (1000 units) tablet          Review of Systems   Constitutional: Negative.    HENT: Negative.     Eyes: Negative.    Respiratory: Negative.     Cardiovascular: Negative.    Gastrointestinal:  Positive for abdominal pain, diarrhea, nausea and vomiting. Negative for blood in stool and constipation.   Endocrine: Negative.    Genitourinary: Negative.    Musculoskeletal: Negative.    Skin: Negative.    Allergic/Immunologic: Negative.    Neurological: Negative.    Hematological: Negative.    Psychiatric/Behavioral:  The patient is nervous/anxious.        Physical Exam   BP: (!) 149/108  Pulse: 107  Temp: 98.1  F (36.7  C)  Resp: 22  SpO2: 97 %      GENERAL APPEARANCE:  The patient is a 29 year old well-developed, well-nourished individual that appears as stated age.  LUNGS:  Breathing is easy.  Breath sounds are equal and clear bilaterally.  No wheezes, rhonchi, or rales.  HEART: Tachycardic rate with regular rhythm with normal S1 and S2.  No murmurs, gallops, or rubs.  ABDOMEN:  Soft.  Lower abdominal tenderness to palpation.  No mass, guarding, or rebound.  No organomegaly or hernia.  Bowel sounds are present.  No CVA tenderness or flank mass.  No abdominal bruits or thrills present upon auscultation/palpation.  GENITOURINARY: No obvious anterior pelvic tenderness, hernia, mass noted to palpation.  PSYCHIATRIC:  The patient is awake, alert, and oriented x4.  Recent and remote memory is intact.  Anxious mood and affect.  Cooperative with history and physical exam.  SKIN:  Warm, dry, and well perfused.  Good turgor.  No lesions, nodules, or rashes are  noted.  No bruising noted.      ED Course     ED Course as of 04/04/25 2025 Fri Apr 04, 2025 1807 In to see patient and history/physical completed.    1807 Labs ordered.  1 L LR ordered.  Diphenhydramine 50 mg IV ordered for anxiety/nausea   1815 EKG 12-lead, tracing only  No acute findings on ECG.   1824 Hydromorphone 0.5 mg IV ordered for pain.   1828 CT abdomen pelvis of IV contrast ordered for left lower quadrant pain.   1936 CT negative for acute abnormality.   2023 All labs negative.  Imaging negative.  Patient feeling better.  Will discharge home with ondansetron.  Clear liquid diet to advance as tolerated.  Follow-up recommendations and return precautions given.            ECG:    ECG competed at 1809 and personally reviewed at 1810 showing sinus tachycardia with ventricular rate of 101 and QTc of 440.  Normal axis.  Normal ECG.  When compared to ECG from 4/9/2013, no significant changes noted.         Results for orders placed or performed during the hospital encounter of 04/04/25 (from the past 24 hours)   EKG 12-lead, tracing only   Result Value Ref Range    Systolic Blood Pressure  mmHg    Diastolic Blood Pressure  mmHg    Ventricular Rate 101 BPM    Atrial Rate 101 BPM    OH Interval 142 ms    QRS Duration 84 ms     ms    QTc 440 ms    P Axis 25 degrees    R AXIS -43 degrees    T Axis 45 degrees    Interpretation ECG       Sinus tachycardia  Left axis deviation  Abnormal ECG  No previous ECGs available     [place] Draw    Narrative    The following orders were created for panel order [place] Draw.  Procedure                               Abnormality         Status                     ---------                               -----------         ------                     Extra Blue Top Tube[[id number]]                              Final result               Extra Red Top Tube[[id number]]                              Final result               Extra Green Top (Lithiu...[[id number]]                       Final result               Extra Purple Top Tube[[id number]]                           Final result               Extra Green Top (Lithiu...[[id number]]                      Final result                 Please view results for these tests on the individual orders.   Extra Blue Top Tube   Result Value Ref Range    Hold Specimen JIC    Extra Red Top Tube   Result Value Ref Range    Hold Specimen JIC    Extra Green Top (Lithium Heparin) Tube   Result Value Ref Range    Hold Specimen JIC    Extra Purple Top Tube   Result Value Ref Range    Hold Specimen JIC    Extra Green Top (Lithium Heparin) ON ICE   Result Value Ref Range    Hold Specimen OK    CBC with platelets   Result Value Ref Range    WBC Count 12.7 (H) 4.0 - 11.0 10e3/uL    RBC Count 6.35 (H) 4.40 - 5.90 10e6/uL    Hemoglobin 18.4 (H) 13.3 - 17.7 g/dL    Hematocrit 52.1 40.0 - 53.0 %    MCV 82 78 - 100 fL    MCH 29.0 26.5 - 33.0 pg    MCHC 35.3 31.5 - 36.5 g/dL    RDW 13.0 10.0 - 15.0 %    Platelet Count 195 150 - 450 10e3/uL   Comprehensive metabolic panel   Result Value Ref Range    Sodium 138 135 - 145 mmol/L    Potassium 3.5 3.4 - 5.3 mmol/L    Carbon Dioxide (CO2) 20 (L) 22 - 29 mmol/L    Anion Gap 18 (H) 7 - 15 mmol/L    Urea Nitrogen 17.3 6.0 - 20.0 mg/dL    Creatinine 0.86 0.67 - 1.17 mg/dL    GFR Estimate >90 >60 mL/min/1.73m2    Calcium 9.9 8.8 - 10.4 mg/dL    Chloride 100 98 - 107 mmol/L    Glucose 139 (H) 70 - 99 mg/dL    Alkaline Phosphatase 72 40 - 150 U/L    AST 20 0 - 45 U/L    ALT 22 0 - 70 U/L    Protein Total 8.7 (H) 6.4 - 8.3 g/dL    Albumin 4.5 3.5 - 5.2 g/dL    Bilirubin Total 1.1 <=1.2 mg/dL   Lipase   Result Value Ref Range    Lipase 15 13 - 60 U/L   CT Abdomen Pelvis w Contrast    Narrative    EXAM: CT ABDOMEN PELVIS W CONTRAST  LOCATION: [facility]  DATE: 4/4/2025    INDICATION: Left lower quadrant pain  COMPARISON: CT 12/26/2023  TECHNIQUE: CT scan of the abdomen and pelvis was performed following injection of IV  contrast. Multiplanar reformats were obtained. Dose reduction techniques were used.  CONTRAST: ISOVUE 370 150  ML    FINDINGS:   LOWER CHEST: Unremarkable.    HEPATOBILIARY: No evidence of cholecystitis or biliary obstruction.    PANCREAS: Normal.    SPLEEN: Normal.    ADRENAL GLANDS: Normal.    KIDNEYS/BLADDER: No hydronephrosis. Urinary bladder is unremarkable.    BOWEL: Diverticulosis of the colon. Fluid noted throughout the small bowel without acute inflammatory change. No obstruction.  Normal appendix.    LYMPH NODES: No suspicious lymphadenopathy. No abdominopelvic free fluid.    VASCULATURE: Normal.    PELVIC ORGANS: Normal.    MUSCULOSKELETAL: No acute bony abnormality.      Impression    IMPRESSION:   1.  No definite visualized explanation for patient's symptoms.   UA with Microscopic reflex to Culture    Specimen: Urine, Clean Catch   Result Value Ref Range    Color Urine Light Yellow Colorless, Straw, Light Yellow, Yellow    Appearance Urine Clear Clear    Glucose Urine Negative Negative mg/dL    Bilirubin Urine Negative Negative    Ketones Urine Negative Negative mg/dL    Specific Gravity Urine 1.005 1.003 - 1.035    Blood Urine Negative Negative    pH Urine 6.5 4.7 - 8.0    Protein Albumin Urine Negative Negative mg/dL    Urobilinogen Urine Normal Normal mg/dL    Nitrite Urine Negative Negative    Leukocyte Esterase Urine Negative Negative    Mucus Urine Present (A) None Seen /LPF    RBC Urine 2 <=2 /HPF    WBC Urine 1 <=5 /HPF    Squamous Epithelials Urine 0 <=1 /HPF    Narrative    Urine Culture not indicated       Medications   lactated ringers BOLUS 1,000 mL (0 mLs Intravenous Stopped 4/4/25 2011)   diphenhydrAMINE (BENADRYL) injection 50 mg (50 mg Intravenous $Given 4/4/25 1818)   HYDROmorphone (PF) (DILAUDID) injection 0.5 mg (0.5 mg Intravenous $Given 4/4/25 1826)   iopamidol (ISOVUE-370) solution 150 mL (150 mLs Intravenous $Given 4/4/25 1852)   sodium chloride (PF) 0.9% PF flush 50 mL (50 mLs  Intravenous $Given 4/4/25 5157)       Assessments & Plan (with Medical Decision Making)     I have reviewed the nursing notes.    I have reviewed the findings, diagnosis, plan and need for follow up with the patient.    Summary:  Patient presents to the ER today for abdominal pain.  Potential diagnosis which have been considered and evaluated include diverticulitis, gastroenteritis, UTI, ureteral stone, pyelonephritis, appendicitis, cholecystitis, pancreatitis, bowel obstruction, intussusception, peritonitis, mesenteric ischemia, as well as others. Many of these have been excluded using the various modalities and assessment as noted on the chart. At the present time, the diagnosis given seems to be the most likely left lower quadrant pain with nausea, vomiting, diarrhea.  Upon arrival, vitals signs show blood pressure 149/108 with a pulse of 107.  Temperature 98.1  F.  Respirations 22 with oxygenation 97% on room air.  The patient is alert and oriented but anxious on arrival.  Respiratory examination normal.  Patient is tachycardic.  Lower abdominal tenderness to palpation but no obvious hernia or mass.  No anterior pelvic tenderness, hernia, mass noted to palpation.  ECG obtained on arrival showing no acute abnormality.  IV established and lab work obtained showing WBC of 12.7 with hemoglobin of 18.4.  Electrolytes, renal, hepatic functions benign.  Lipase normal at 15.  UA negative.  CT abdomen pelvis of IV contrast conducted showing no acute abnormality.  1 L LR given for hydration.  Diphenhydramine 50 mg IV given for nausea and anxiety.  Hydromorphone 0.5 mg IV given for pain control with improvement of symptoms.  Patient able to tolerate oral fluids.  Will discharge home as no acute findings noted.  Ondansetron 4-8 mg every 8 hours as needed for nausea.  Clear liquid diet to advance as tolerated.  Follow-up with PCP as needed and return to ER if new or worsening symptoms.  Patient verbalized understand agrees  with plan of care.  Patient discharged home.      Critical Care Time: None    Impression and plan discussed with patient. Questions answered, concerns addressed, indications for urgent re-evaluation reviewed, and  given. Patient/Parent/Caregiver agree with treatment plan and have no further questions at this time.  AVS provided at discharge.    This document was prepared using a combination of typing and voice generated software.  While every attempt was made for accuracy, spelling and grammatical errors may exist.              New Prescriptions    ONDANSETRON (ZOFRAN ODT) 4 MG ODT TAB    Take 1-2 tablets (4-8 mg) by mouth every 8 hours as needed for nausea.       Final diagnoses:   LLQ abdominal pain   Nausea vomiting and diarrhea       4/4/2025   [facility]       Derian Rodriguez APRN CNP  04/04/25 2025

## 2025-04-05 NOTE — DISCHARGE INSTRUCTIONS
Clear liquids:   You should allow your bowel to rest.  Once able, starting hydrating by sipping on ice cold fluids.  The best way to do this is to start off with clear liquids.  The best choices for this is G2, Gatorade Zero, or PowerAde Zero.  Do not used sugary drinks.  If you want to use juice, start out by making it half strength by adding half water and half juice.  When able, advance your diet as tolerated.           Follow-up with your primary care provider for reevaluation.  Contact your primary care provider if you have any questions or concerns.  Do not hesitate to return to the ER if any new or worsening symptoms.     Please read the attached instructions (if any).  They highlight more specific treatments and interventions for you at home.              Thank you for letting me participate in your care and wish you a fast and uneventful recovery,    Derian FOX CNP    Do not hesitate to contact me with questions or concerns.  [email or web address]

## 2025-04-05 NOTE — ED NOTES
Face to face report given with opportunity to observe patient.    Report given to Nanda Yanez RN   4/4/2025  7:17 PM

## 2025-04-08 LAB
ATRIAL RATE - MUSE: 101 BPM
DIASTOLIC BLOOD PRESSURE - MUSE: NORMAL MMHG
INTERPRETATION ECG - MUSE: NORMAL
P AXIS - MUSE: 25 DEGREES
PR INTERVAL - MUSE: 142 MS
QRS DURATION - MUSE: 84 MS
QT - MUSE: 340 MS
QTC - MUSE: 440 MS
R AXIS - MUSE: -43 DEGREES
SYSTOLIC BLOOD PRESSURE - MUSE: NORMAL MMHG
T AXIS - MUSE: 45 DEGREES
VENTRICULAR RATE- MUSE: 101 BPM

## 2025-06-09 ENCOUNTER — HOSPITAL ENCOUNTER (EMERGENCY)
Facility: HOSPITAL | Age: 30
Discharge: HOME | End: 2025-06-09
Payer: COMMERCIAL

## 2025-06-09 ENCOUNTER — APPOINTMENT (OUTPATIENT)
Dept: GENERAL RADIOLOGY | Facility: HOSPITAL | Age: 30
End: 2025-06-09
Payer: COMMERCIAL

## 2025-06-09 VITALS
HEART RATE: 96 BPM | HEIGHT: 73 IN | SYSTOLIC BLOOD PRESSURE: 146 MMHG | DIASTOLIC BLOOD PRESSURE: 90 MMHG | BODY MASS INDEX: 41.75 KG/M2 | OXYGEN SATURATION: 97 % | TEMPERATURE: 98 F | RESPIRATION RATE: 18 BRPM | WEIGHT: 315 LBS

## 2025-06-09 DIAGNOSIS — S96.912A MUSCLE STRAIN OF LEFT FOOT, INITIAL ENCOUNTER: ICD-10-CM

## 2025-06-09 LAB
BASOPHILS # BLD AUTO: 0 10E3/UL (ref 0–0.2)
BASOPHILS NFR BLD AUTO: 0 %
EOSINOPHIL # BLD AUTO: 0.1 10E3/UL (ref 0–0.7)
EOSINOPHIL NFR BLD AUTO: 1 %
ERYTHROCYTE [DISTWIDTH] IN BLOOD BY AUTOMATED COUNT: 13.2 % (ref 10–15)
HCT VFR BLD AUTO: 48.1 % (ref 40–53)
HGB BLD-MCNC: 16.5 G/DL (ref 13.3–17.7)
HOLD SPECIMEN: NORMAL
IMM GRANULOCYTES # BLD: 0 10E3/UL
IMM GRANULOCYTES NFR BLD: 0 %
LYMPHOCYTES # BLD AUTO: 1.7 10E3/UL (ref 0.8–5.3)
LYMPHOCYTES NFR BLD AUTO: 19 %
MCH RBC QN AUTO: 28.9 PG (ref 26.5–33)
MCHC RBC AUTO-ENTMCNC: 34.3 G/DL (ref 31.5–36.5)
MCV RBC AUTO: 84 FL (ref 78–100)
MONOCYTES # BLD AUTO: 0.6 10E3/UL (ref 0–1.3)
MONOCYTES NFR BLD AUTO: 7 %
NEUTROPHILS # BLD AUTO: 6.4 10E3/UL (ref 1.6–8.3)
NEUTROPHILS NFR BLD AUTO: 72 %
NRBC # BLD AUTO: 0 10E3/UL
NRBC BLD AUTO-RTO: 0 /100
PLAT MORPH BLD: ABNORMAL
PLATELET # BLD AUTO: NORMAL 10*3/UL
PROCALCITONIN SERPL IA-MCNC: 0.02 NG/ML
RBC # BLD AUTO: 5.7 10E6/UL (ref 4.4–5.9)
RBC MORPH BLD: ABNORMAL
WBC # BLD AUTO: 8.9 10E3/UL (ref 4–11)

## 2025-06-09 PROCEDURE — G0463 HOSPITAL OUTPT CLINIC VISIT: HCPCS

## 2025-06-09 PROCEDURE — 99213 OFFICE O/P EST LOW 20 MIN: CPT

## 2025-06-09 PROCEDURE — 73630 X-RAY EXAM OF FOOT: CPT | Mod: 26 | Performed by: RADIOLOGY

## 2025-06-09 PROCEDURE — 85041 AUTOMATED RBC COUNT: CPT

## 2025-06-09 PROCEDURE — 84145 PROCALCITONIN (PCT): CPT

## 2025-06-09 PROCEDURE — 73630 X-RAY EXAM OF FOOT: CPT | Mod: LT

## 2025-06-09 PROCEDURE — 36415 COLL VENOUS BLD VENIPUNCTURE: CPT

## 2025-06-09 ASSESSMENT — ENCOUNTER SYMPTOMS
WOUND: 0
JOINT SWELLING: 0
ARTHRALGIAS: 1
COLOR CHANGE: 1

## 2025-06-09 ASSESSMENT — ACTIVITIES OF DAILY LIVING (ADL): ADLS_ACUITY_SCORE: 41

## 2025-06-09 NOTE — Clinical Note
Edgar Garrett was seen and treated in our emergency department on 6/9/2025.  He may return to work on 06/09/2025.  May return light duty for 2 weeks.      If you have any questions or concerns, please don't hesitate to call.      Bee Carcamo, APRN CNP

## 2025-06-09 NOTE — DISCHARGE INSTRUCTIONS
Rest - Please avoid activities that contribute to discomfort  Ice affected area often as tolerated  Elevate affected area as able  Anti-inflammatory medication OTC or as prescribed  Please go to ER or UC with increased or new symptoms  Follow-up as discussed  ACE wrap placed  Work note given

## 2025-06-09 NOTE — ED PROVIDER NOTES
History     Chief Complaint   Patient presents with    Toe Pain     The history is provided by the patient.     Edgar Garrett is a 29 year old male who presents for left great toe pain    Allergies:  No Known Allergies    Problem List:    Patient Active Problem List    Diagnosis Date Noted    MUSA (obstructive sleep apnea) - Mild 03/21/2024     Priority: Medium     Mild MUSA (AHI 11.6) without sleep-associated hypoxemia.   - Potential that severity under-reported given no supine REM observed     PSG on 3/5/2024 with weight 337 pounds, BMI 43.7.  Mild MUSA (AHI 11.6) without sleep-associated hypoxemia.  Potential that severity under-reported given no supine REM observed.  No PLM's.  EKG normal sinus rhythm.      Gastroesophageal reflux disease 01/11/2019     Priority: Medium    Generalized anxiety disorder 01/11/2019     Priority: Medium    Essential hypertension with goal blood pressure less than 140/90 06/20/2016     Priority: Medium        Past Medical History:    No past medical history on file.    Past Surgical History:    No past surgical history on file.    Family History:    No family history on file.    Social History:  Marital Status:  Single [1]  Social History     Tobacco Use    Smoking status: Never     Passive exposure: Past   Vaping Use    Vaping status: Some Days   Substance Use Topics    Drug use: No        Medications:    CALCIUM-MAGNESIUM-ZINC PO  ClonazePAM (KLONOPIN PO)  DiphenhydrAMINE HCl (BENADRYL ALLERGY PO)  fluticasone (FLONASE) 50 MCG/ACT nasal spray  IBUPROFEN PO  lisinopril (PRINIVIL/ZESTRIL) 10 MG tablet  Multiple Vitamin (MULTIVITAMIN ADULT) TABS  multivitamin w/minerals (MULTI-VITAMIN) tablet  Omeprazole (PRILOSEC PO)  omeprazole (PRILOSEC) 20 MG DR capsule  ondansetron (ZOFRAN ODT) 4 MG ODT tab  propranolol (INDERAL) 20 MG tablet  Vitamin D3 (CHOLECALCIFEROL) 25 mcg (1000 units) tablet          Review of Systems   Musculoskeletal:  Positive for arthralgias. Negative for joint  "swelling.   Skin:  Positive for color change. Negative for wound.   All other systems reviewed and are negative.      Physical Exam   BP: (!) 146/90  Pulse: 96  Temp: 98  F (36.7  C)  Resp: 18  Height: 185.4 cm (6' 1\")  Weight: (!) 145.2 kg (320 lb)  SpO2: 97 %      Physical Exam  Vitals and nursing note reviewed.   Constitutional:       General: He is not in acute distress.     Appearance: He is not ill-appearing, toxic-appearing or diaphoretic.   Pulmonary:      Effort: Pulmonary effort is normal.   Musculoskeletal:      Left foot: Normal range of motion and normal capillary refill. Swelling and tenderness present. No deformity, bunion, laceration, bony tenderness or crepitus. Normal pulse.        Legs:    Skin:     Capillary Refill: Capillary refill takes 2 to 3 seconds.      Findings: Erythema present.   Neurological:      General: No focal deficit present.      Mental Status: He is alert and oriented to person, place, and time. Mental status is at baseline.         ED Course     ED Course as of 06/09/25 1337   Mon Jun 09, 2025   1238 Edgar is a nontoxic appearing male who presents to  for evaluation of left great toe pain at distal aspect of 1st metatarsal. He reports 2 days ago, he was standing on logs using a chainsaw and began falling. He readjusted his weight to avoid the chainsaw falling on his leg/foot but ended up jamming his left foot between 2 logs. Pain is constant and remains between 4-8/10. Ibuprofen somewhat helps. No physical deformity, laceration or open area. New onset erythema noted at distal aspect of 1st metatarsal of great toe with associated tenderness to palpation. Able to bear weight but endorses favoring the lateral aspect of his left foot d/t pain. CMS intact - cap refill 2-3 seconds on erythematous area. Erythema warm/hot to touch. No numbness or tingling. Denies fevers, s/sx of systemic illness.    1258 FINDINGS:  No fracture or dislocation is identified. The joint spaces  are " preserved.     1322 CBC unremarkable, pseudothrombocytopenia noted. Most recent platelet count 2 months ago was 195. No signs of bleeding.      1328 Discharge instructions and education completed with Edgar Garrett. Edgar Garrett verbalized understanding of eduction and discharge instructions provided, which includes return to ED for re-evaluation criteria. They are in agreement with plan of care.                 Results for orders placed or performed during the hospital encounter of 06/09/25 (from the past 24 hours)   Foot XR, G/E 3 views, left    Narrative    PROCEDURE:  XR FOOT LEFT G/E 3 VIEWS    HISTORY: foot fell between 2 logs, new onset erythema at distal aspect  of 1st metatarsal    COMPARISON:  8/30/2022    TECHNIQUE:  3 views of the left foot were obtained.    FINDINGS:  No fracture or dislocation is identified. The joint spaces  are preserved.        Impression    IMPRESSION: No acute fracture.      LEONCIO JUNIOR MD         SYSTEM ID:  [id number]   CBC with platelets differential    Narrative    The following orders were created for panel order CBC with platelets differential.  Procedure                               Abnormality         Status                     ---------                               -----------         ------                     CBC with platelets and ...[[id number]]                      Final result               RBC and Platelet Morpho...[[id number]]  Abnormal            Final result                 Please view results for these tests on the individual orders.   Procalcitonin   Result Value Ref Range    Procalcitonin 0.02 <0.50 ng/mL   CBC with platelets and differential   Result Value Ref Range    WBC Count 8.9 4.0 - 11.0 10e3/uL    RBC Count 5.70 4.40 - 5.90 10e6/uL    Hemoglobin 16.5 13.3 - 17.7 g/dL    Hematocrit 48.1 40.0 - 53.0 %    MCV 84 78 - 100 fL    MCH 28.9 26.5 - 33.0 pg    MCHC 34.3 31.5 - 36.5 g/dL    RDW 13.2 10.0 - 15.0 %    Platelet Count      % Neutrophils  72 %    % Lymphocytes 19 %    % Monocytes 7 %    % Eosinophils 1 %    % Basophils 0 %    % Immature Granulocytes 0 %    NRBCs per 100 WBC 0 <1 /100    Absolute Neutrophils 6.4 1.6 - 8.3 10e3/uL    Absolute Lymphocytes 1.7 0.8 - 5.3 10e3/uL    Absolute Monocytes 0.6 0.0 - 1.3 10e3/uL    Absolute Eosinophils 0.1 0.0 - 0.7 10e3/uL    Absolute Basophils 0.0 0.0 - 0.2 10e3/uL    Absolute Immature Granulocytes 0.0 <=0.4 10e3/uL    Absolute NRBCs 0.0 10e3/uL   Extra Tube    Narrative    The following orders were created for panel order Extra Tube.  Procedure                               Abnormality         Status                     ---------                               -----------         ------                     Extra Red Top Tube[[id number]]                              In process                   Please view results for these tests on the individual orders.   RBC and Platelet Morphology   Result Value Ref Range    RBC Morphology Confirmed RBC Indices     Platelet Assessment Platelets Clumped (A) Automated Count Confirmed. Platelet morphology is normal.       Medications - No data to display    Assessments & Plan (with Medical Decision Making)     I have reviewed the nursing notes.    I have reviewed the findings, diagnosis, plan and need for follow up with the patient.  (J63.586P) Muscle strain of left foot, initial encounter  Comment: no acute fractures on xray   Plan:    Rest - Please avoid activities that contribute to discomfort  Ice affected area often as tolerated  Elevate affected area as able  Anti-inflammatory medication OTC or as prescribed  Please go to ER or UC with increased or new symptoms  Follow-up as discussed  ACE wrap placed  Work note given      Discharge Medication List as of 6/9/2025  1:35 PM          Final diagnoses:   Muscle strain of left foot, initial encounter       6/9/2025   [facility]       Bee Carcamo, DOMINIQUE CNP  06/09/25 0335

## 2025-06-09 NOTE — ED TRIAGE NOTES
Pt presents with concerns of left great toe pain, pt reports getting toe stuck between toe logs

## 2025-08-14 ENCOUNTER — OFFICE VISIT (OUTPATIENT)
Dept: CHIROPRACTIC MEDICINE | Facility: OTHER | Age: 30
End: 2025-08-14
Attending: CHIROPRACTOR
Payer: COMMERCIAL

## 2025-08-14 DIAGNOSIS — M99.01 SEGMENTAL AND SOMATIC DYSFUNCTION OF CERVICAL REGION: ICD-10-CM

## 2025-08-14 DIAGNOSIS — M99.03 SEGMENTAL AND SOMATIC DYSFUNCTION OF LUMBAR REGION: Primary | ICD-10-CM

## 2025-08-14 DIAGNOSIS — M99.02 SEGMENTAL AND SOMATIC DYSFUNCTION OF THORACIC REGION: ICD-10-CM

## 2025-08-14 DIAGNOSIS — M54.50 ACUTE BILATERAL LOW BACK PAIN WITHOUT SCIATICA: ICD-10-CM
